# Patient Record
Sex: FEMALE | Race: BLACK OR AFRICAN AMERICAN | HISPANIC OR LATINO | Employment: FULL TIME | URBAN - METROPOLITAN AREA
[De-identification: names, ages, dates, MRNs, and addresses within clinical notes are randomized per-mention and may not be internally consistent; named-entity substitution may affect disease eponyms.]

---

## 2017-01-02 ENCOUNTER — HOSPITAL ENCOUNTER (EMERGENCY)
Facility: HOSPITAL | Age: 37
Discharge: HOME | End: 2017-01-02
Attending: FAMILY MEDICINE
Payer: MEDICAID

## 2017-01-02 VITALS
HEIGHT: 67 IN | OXYGEN SATURATION: 100 % | SYSTOLIC BLOOD PRESSURE: 112 MMHG | HEART RATE: 85 BPM | DIASTOLIC BLOOD PRESSURE: 69 MMHG | WEIGHT: 150 LBS | BODY MASS INDEX: 23.54 KG/M2 | TEMPERATURE: 98 F | RESPIRATION RATE: 20 BRPM

## 2017-01-02 DIAGNOSIS — M54.2 NECK PAIN, ACUTE: ICD-10-CM

## 2017-01-02 DIAGNOSIS — M54.9 BACK PAIN: ICD-10-CM

## 2017-01-02 DIAGNOSIS — M25.512 SHOULDER PAIN, LEFT: ICD-10-CM
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
## 2017-01-02 PROCEDURE — 63600175 PHARM REV CODE 636 W HCPCS: Performed by: FAMILY MEDICINE

## 2017-01-02 PROCEDURE — 96372 THER/PROPH/DIAG INJ SC/IM: CPT

## 2017-01-02 PROCEDURE — 99284 EMERGENCY DEPT VISIT MOD MDM: CPT | Mod: 25

## 2017-01-02 RX ORDER — ORPHENADRINE CITRATE 30 MG/ML
60 INJECTION INTRAMUSCULAR; INTRAVENOUS
Status: COMPLETED | OUTPATIENT
Start: 2017-01-02 | End: 2017-01-02

## 2017-01-02 RX ORDER — IBUPROFEN 800 MG/1
800 TABLET ORAL 3 TIMES DAILY
Qty: 30 TABLET | Refills: 0 | Status: SHIPPED | OUTPATIENT
Start: 2017-01-02 | End: 2017-01-02

## 2017-01-02 RX ORDER — CYCLOBENZAPRINE HCL 10 MG
10 TABLET ORAL 3 TIMES DAILY PRN
Qty: 30 TABLET | Refills: 0 | Status: SHIPPED | OUTPATIENT
Start: 2017-01-02 | End: 2017-01-12

## 2017-01-02 RX ORDER — MORPHINE SULFATE 2 MG/ML
4 INJECTION, SOLUTION INTRAMUSCULAR; INTRAVENOUS
Status: COMPLETED | OUTPATIENT
Start: 2017-01-02 | End: 2017-01-02

## 2017-01-02 RX ORDER — HYDROCODONE BITARTRATE AND ACETAMINOPHEN 10; 325 MG/1; MG/1
1 TABLET ORAL EVERY 8 HOURS PRN
Qty: 18 TABLET | Refills: 0 | Status: SHIPPED | OUTPATIENT
Start: 2017-01-02 | End: 2017-02-09

## 2017-01-02 RX ADMIN — MORPHINE SULFATE 4 MG: 2 INJECTION, SOLUTION INTRAMUSCULAR; INTRAVENOUS at 12:01

## 2017-01-02 RX ADMIN — ORPHENADRINE CITRATE 60 MG: 30 INJECTION INTRAMUSCULAR; INTRAVENOUS at 12:01
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
## 2017-01-23 ENCOUNTER — OFFICE VISIT (OUTPATIENT)
Dept: FAMILY MEDICINE | Facility: CLINIC | Age: 37
End: 2017-01-23
Payer: MEDICAID

## 2017-01-23 VITALS
OXYGEN SATURATION: 98 % | HEART RATE: 60 BPM | WEIGHT: 155.88 LBS | HEIGHT: 67 IN | BODY MASS INDEX: 24.47 KG/M2 | SYSTOLIC BLOOD PRESSURE: 90 MMHG | TEMPERATURE: 98 F | RESPIRATION RATE: 18 BRPM | DIASTOLIC BLOOD PRESSURE: 70 MMHG

## 2017-01-23 DIAGNOSIS — N76.1 CHRONIC VAGINITIS: ICD-10-CM

## 2017-01-23 DIAGNOSIS — R11.0 NAUSEA: Primary | ICD-10-CM

## 2017-01-23 PROCEDURE — 99214 OFFICE O/P EST MOD 30 MIN: CPT | Mod: PBBFAC,PN | Performed by: NURSE PRACTITIONER

## 2017-01-23 PROCEDURE — 99214 OFFICE O/P EST MOD 30 MIN: CPT | Mod: S$PBB,,, | Performed by: NURSE PRACTITIONER

## 2017-01-23 PROCEDURE — 99999 PR PBB SHADOW E&M-EST. PATIENT-LVL IV: CPT | Mod: PBBFAC,,, | Performed by: NURSE PRACTITIONER

## 2017-01-23 RX ORDER — METRONIDAZOLE 500 MG/1
500 TABLET ORAL EVERY 12 HOURS
Qty: 14 TABLET | Refills: 0 | Status: SHIPPED | OUTPATIENT
Start: 2017-01-23 | End: 2017-01-30

## 2017-01-23 RX ORDER — ONDANSETRON 8 MG/1
8 TABLET, ORALLY DISINTEGRATING ORAL EVERY 8 HOURS PRN
Qty: 12 TABLET | Refills: 0 | Status: SHIPPED | OUTPATIENT
Start: 2017-01-23 | End: 2017-02-09

## 2017-01-23 RX ORDER — SERTRALINE HYDROCHLORIDE 50 MG/1
TABLET, FILM COATED ORAL
Refills: 0 | COMMUNITY
Start: 2016-12-29 | End: 2017-02-09

## 2017-01-23 RX ORDER — OXYBUTYNIN CHLORIDE 5 MG/1
TABLET, EXTENDED RELEASE ORAL
COMMUNITY
Start: 2016-12-24 | End: 2017-02-09

## 2017-01-23 RX ORDER — FLUCONAZOLE 150 MG/1
150 TABLET ORAL ONCE
Qty: 1 TABLET | Refills: 0 | Status: SHIPPED | OUTPATIENT
Start: 2017-01-23 | End: 2017-01-23

## 2017-01-23 RX ORDER — OXCARBAZEPINE 300 MG/1
TABLET, FILM COATED ORAL
Refills: 0 | COMMUNITY
Start: 2016-12-29 | End: 2017-02-09

## 2017-01-23 NOTE — PATIENT INSTRUCTIONS
Hypoglycemia (Low Blood Sugar)  Too little sugar (glucose) in your blood is called hypoglycemia or low blood sugar. Low blood sugar usually means anything lower than 70 mg/dL. Talk with your healthcare provider about your target range and what level is too low for you. Diabetes itself doesnt cause low blood sugar. But some of the treatments for diabetes, such as pills or insulin, may raise your risk for it. Low blood sugar may cause you to pass out or have a seizure. So always treat low blood sugar right away, but don't overeat.  Special note: Always carry a source of fast-acting sugar and a snack in case of hypoglycemia.     What you may notice  If you have low blood sugar, you may have one or more of these symptoms:  · Shakiness or dizziness  · Cold, clammy skin or sweating  · Feelings of hunger  · Headache  · Nervousness  · A hard, fast heartbeat  · Weakness  · Confusion or irritability  · Blurred vision  · Having nightmares or waking up confused or sweating  · Numbness or tingling in the lips or tongue  What you should do  Here are tips to follow if you have hypoglycemia:   · First check your blood sugar. If it is too low (out of your target range), eat or drink 15 to 20 grams of fast-acting sugar. This may be 3 to 4 glucose tablets, 4 ounces (half a cup) of fruit juice or regular (nondiet) soda, 8 ounces (1 cup) of fat-free milk, or 1 tablespoon of honey. Dont take more than this, or your blood sugar may go too high.  · Wait 15 minutes. Then recheck your blood sugar if you can.  · If your blood sugar is still too low, repeat the steps above and check your blood sugar again. If your blood sugar still has not returned to your target range, contact your healthcare provider or seek emergency care.  · Once your blood sugar returns to target range, eat a snack or meal.  Preventing low blood sugar  Things you can do include the following:   · If your condition needs a strict treatment plan, eat your meals and  snacks at the same times each day. Dont skip meals!  · If your treatment plan lets you change when you eat and what you eat, learn how to change the time and dose of your rapid-acting insulin to match this.   · Ask your healthcare provider if it is safe for you to drink alcohol. Never drink on an empty stomach.  · Take your medicine at the prescribed times.  · Always carry a source of fast-acting sugar and a snack when youre away from home.  Other things to do  Additional tips include the following:  · Carry a medical ID card, a compact USB drive, or wear a medical alert bracelet or necklace. It should say that you have diabetes. It should also say what to do if you pass out or have a seizure.  · Make sure your family, friends, and coworkers know the signs of low blood sugar. Tell them what to do if your blood sugar falls very low and you cant treat yourself.  · Keep a glucagon emergency kit handy. Be sure your family, friends, and coworkers know how and when to use it. Check it regularly and replace the glucagon before it expires.  · Talk with your health care team about other things you can do to prevent low blood sugar.     If you have unexplained hypoglycemia or hypoglycemia several times, call your healthcare provider.   © 7779-2426 [employer]. [address] [place] [address], [place] [address]. All rights reserved. This information is not intended as a substitute for professional medical care. Always follow your healthcare professional's instructions.        Hypoglycemic Reaction (Nondiabetic)  You have had an episode of low blood sugar (hypoglycemia). A single episode of hypoglycemia does not mean that you are diabetic or that this problem will recur. There are many causes for low blood sugar. These include eating highly refined starchy foods (carbohydrates), drinking too much alcohol, intense exercise, fatigue, stress, poor diet, pregnancy, and certain illnesses.  Your blood sugar level may also be  affected by tobacco, caffeine, and certain medicines, including:  · Aspirin  · Haloperidol  · Propoxyphene  · Chlorpromazine  · Propranolol  · Disopyramide  · ACE inhibitors  A class of medicine called beta-blockers is used for high blood pressure, rapid heart rates, and other conditions. Beta-blockers may prevent the early symptoms of low blood sugar. If you are taking a beta-blocker, you might not realize that your blood sugar is getting low. If you are taking a beta-blocker and are prone to low blood sugar, talk to your healthcare provider about switching to a different class of medicine. The beta-blocker class includes:  · Propranolol  · Atenolol  · Metoprolol  · Nadolol  · Labetalol  · Carvedilol  Home care  · Rest today and resume a normal diet. Eliminate any of the above known causes where possible.  · The proper diet for true hypoglycemia (diagnosed with a glucose tolerance test) is high protein (20% of calories), low carbohydrate (50% of calories), and moderate fat (30% of calories) in 6 small meals per day.  · If this is your first episode of low blood sugar, or if you have not yet been tested with a glucose tolerance test, eat small frequent meals rather than fewer large meals. Limit starchy foods during the next 1 to 2 days to avoid a recurrence of low blood sugar.  · It is important to learn the warning signals your body gives as your blood sugar starts to drop. See the symptoms listed below.  If symptoms of hypoglycemia return  · Keep a source of fast-acting sugar with you. At the first sign of low blood sugar, eat or drink 15 to 20 grams of fast-acting sugar. Examples include:  ¨ 3 to 4 glucose tablets (found at most drugstores)  ¨ 4 ounces of regular soda  ¨ 4 ounces of fruit juice  ¨ 2 tablespoons of raisins  ¨ 1 tablespoon of honey  · If consuming fast-acting sugar does not improve your symptoms within 20 minutes, go to an emergency room.  · If you have severe hypoglycemic spells, wear a medical  alert bracelet or carry a card in your wallet describing this condition. If you have a severe hypoglycemic reaction and are unable to give this information, it will help medical staff provide proper care.  Follow-up care  Follow up with your healthcare provider, or as advised.  When to seek medical advice  Call your healthcare provider right away if any of these symptoms of low blood sugar occur.  · Fatigue or headache  · Trembling or excess sweating  · Hunger  · Feeling anxious or restless  · Vision changes  · Irritability  · Sleepiness  · Dizziness  Call 911  Contact emergency services right away if any of these occur:  · Drowsiness  · Weakness  · Confusion  · Loss of consciousness  © 8695-3772 [employer] [address] [place] [address], [place] [address]. All rights reserved. This information is not intended as a substitute for professional medical care. Always follow your healthcare professional's instructions.        Vaginal Infection: Yeast (Candidiasis)  Yeast infection occurs when yeast in the vagina increase and start attacking the vaginal tissues. Yeast is a type of fungus. These infections are often caused by a type of yeast called Candida albicans. Other species of yeast can also cause infections. Factors that may make infection more likely include recent antibiotic use, douching, or increased sex. Yeast infections are more common in women who have diabetes, or are obese or pregnant, or have a weak immune system.  Symptoms of yeast infection  · Clumpy or thin, white discharge, which may look like cottage cheese  · No odor or minimal odor  · Severe vaginal itching or burning  · Burning with urination  · Swelling, redness of vulva  · Pain during sex  Treating yeast infection  Yeast infection is treated with a vaginal antifungal cream. In some cases, antifungal pills are prescribed instead. During treatment:  · Finish all of your medicine, even if your symptoms go away.  · Apply the cream before  going to bed. Lie flat after applying so that it doesn't drip out.  · Do not douche or use tampons.  · Don't rely on a diaphragm or condoms, since the cream may weaken them.  · Avoid intercourse if advised by your healthcare provider.     Should I treat a yeast infection myself?  Discuss with your healthcare provider whether you should use over-the-counter medicines to treat a yeast infection. Self-treatment may depend on whether:  · You've had a yeast infection in the past.  · You're at risk for STDs.  Call your healthcare provider if symptoms do not go away or come back after treatment.   © 0841-0777 [employer]. [address] [place] [address], [place] [address]. All rights reserved. This information is not intended as a substitute for professional medical care. Always follow your healthcare professional's instructions.        Candida Vaginal Infection    You have a Candida vaginal infection. This is also known as a yeast infection. It is most often caused by a type of yeast (fungus) called Candida. Candida are normally found in the vagina. But if they increase in number, this can lead to infection and cause symptoms.  Symptoms of a yeast infection can include:  · Clumpy or thin, white discharge, which may look like cottage cheese  · Itching or burning  · Burning with urination  Certain factors can make a yeast infection more likely. These can include:  · Taking certain medicines, such as antibiotics or birth control pills  · Pregnancy  · Diabetes  · Weakened immune system  A yeast infection is most often treated with antifungal medicine. This may be given as a vaginal cream or pills you take by mouth. Treatment may last for about 1 to 7 days. Women with severe or recurrent infections may need longer courses of treatment.  Home care  · If youre prescribed medicine, be sure to use it as directed. Finish all of the medicine, even if your symptoms go away. Note: Dont try to treat yourself using  over-the-counter products without talking to your provider first. He or she will let you know if this is a good option for you.  · Ask your provider what steps you can take to help reduce your risk of having a yeast infection in the future.  Follow-up care  Follow up with your healthcare provider, or as directed.  When to seek medical advice  Call your healthcare provider right away if:  · You have a fever of 100.4ºF (38ºC) or higher, or as directed by your provider.  · Your symptoms worsen, or they dont go away within a few days of starting treatment.  · You have new pain in the lower belly or pelvic region.  · You have side effects that bother you or a reaction to the cream or pills youre prescribed.  · You or any partners you have sex with have new symptoms, such as a rash, joint pain, or sores.  © 5098-5681 [employer]. [address] [place] [address], [place] [address]. All rights reserved. This information is not intended as a substitute for professional medical care. Always follow your healthcare professional's instructions.

## 2017-01-23 NOTE — MR AVS SNAPSHOT
[facility]  [address]  [place] [address]  Phone: [phone]                  Valerie Sung   2017 10:00 AM   Office Visit    Description:  Female : 1980   Provider:  NNEKA Arceo   Department:  [facility]           Reason for Visit     Nausea                To Do List           Goals (5 Years of Data)     None      Follow-Up and Disposition     Return if symptoms worsen or fail to improve.       These Medications        Disp Refills Start End    fluconazole (DIFLUCAN) 150 MG Tab 1 tablet 0 2017    Take 1 tablet (150 mg total) by mouth once. - Oral    Pharmacy: [facility] [address] [facility] AT [facility] Ph #: [phone]       metronidazole (FLAGYL) 500 MG tablet 14 tablet 0 2017    Take 1 tablet (500 mg total) by mouth every 12 (twelve) hours. - Oral    Pharmacy: [facility] [address], [place] [address] [facility] Ph #: [phone]       ondansetron (ZOFRAN-ODT) 8 MG TbDL 12 tablet 0 2017     Take 1 tablet (8 mg total) by mouth every 8 (eight) hours as needed. - Oral    Pharmacy: [facility] [address] [facility] AT [facility] Ph #: [phone]         [facility]     [facility] On Call Nurse Care Line -  Assistance  Registered nurses in the [facility] On Call Center provide clinical advisement, health education, appointment booking, and other advisory services.  Call for this free service at [phone].             Medications           Message regarding Medications     Verify the changes and/or additions to your medication regime listed below are the same as discussed with your clinician today.  If any of these changes or additions are incorrect, please notify your healthcare provider.        START taking these NEW medications         "Refills    fluconazole (DIFLUCAN) 150 MG Tab 0    Sig: Take 1 tablet (150 mg total) by mouth once.    Class: Normal    Route: Oral    metronidazole (FLAGYL) 500 MG tablet 0    Sig: Take 1 tablet (500 mg total) by mouth every 12 (twelve) hours.    Class: Normal    Route: Oral    ondansetron (ZOFRAN-ODT) 8 MG TbDL 0    Sig: Take 1 tablet (8 mg total) by mouth every 8 (eight) hours as needed.    Class: Normal    Route: Oral           Verify that the below list of medications is an accurate representation of the medications you are currently taking.  If none reported, the list may be blank. If incorrect, please contact your healthcare provider. Carry this list with you in case of emergency.           Current Medications     oxcarbazepine (TRILEPTAL) 300 MG Tab TK 1 T PO BID    oxybutynin (DITROPAN-XL) 5 MG TR24     sertraline (ZOLOFT) 50 MG tablet TK 1 T PO IN THE MORNING    fluconazole (DIFLUCAN) 150 MG Tab Take 1 tablet (150 mg total) by mouth once.    hydrocodone-acetaminophen 10-325mg (NORCO)  mg Tab Take 1 tablet by mouth every 8 (eight) hours as needed for Pain.    metronidazole (FLAGYL) 500 MG tablet Take 1 tablet (500 mg total) by mouth every 12 (twelve) hours.    ondansetron (ZOFRAN-ODT) 8 MG TbDL Take 1 tablet (8 mg total) by mouth every 8 (eight) hours as needed.           Clinical Reference Information           Vital Signs - Last Recorded  Most recent update: 1/23/2017  9:52 AM by Susan Reed MA    BP Pulse Temp Resp Ht Wt    90/70 (BP Location: Left arm, Patient Position: Sitting, BP Method: Manual) 60 98.4 °F (36.9 °C) (Oral) 18 5' 7" (1.702 m) 70.7 kg (155 lb 13.8 oz)    LMP SpO2 BMI          12/25/2015 98% 24.41 kg/m2        Blood Pressure          Most Recent Value    BP  90/70      Allergies as of 1/23/2017     Adhesive    Other    Penicillins    Dilaudid [Hydromorphone (Bulk)]    Latex, Natural Rubber    Sulfur      Immunizations Administered on Date of Encounter - 1/23/2017     None    "   Maintenance Dialysis History     Patient has no recorded history of maintenance dialysis.      Instructions      Hypoglycemia (Low Blood Sugar)  Too little sugar (glucose) in your blood is called hypoglycemia or low blood sugar. Low blood sugar usually means anything lower than 70 mg/dL. Talk with your healthcare provider about your target range and what level is too low for you. Diabetes itself doesnt cause low blood sugar. But some of the treatments for diabetes, such as pills or insulin, may raise your risk for it. Low blood sugar may cause you to pass out or have a seizure. So always treat low blood sugar right away, but don't overeat.  Special note: Always carry a source of fast-acting sugar and a snack in case of hypoglycemia.     What you may notice  If you have low blood sugar, you may have one or more of these symptoms:  · Shakiness or dizziness  · Cold, clammy skin or sweating  · Feelings of hunger  · Headache  · Nervousness  · A hard, fast heartbeat  · Weakness  · Confusion or irritability  · Blurred vision  · Having nightmares or waking up confused or sweating  · Numbness or tingling in the lips or tongue  What you should do  Here are tips to follow if you have hypoglycemia:   · First check your blood sugar. If it is too low (out of your target range), eat or drink 15 to 20 grams of fast-acting sugar. This may be 3 to 4 glucose tablets, 4 ounces (half a cup) of fruit juice or regular (nondiet) soda, 8 ounces (1 cup) of fat-free milk, or 1 tablespoon of honey. Dont take more than this, or your blood sugar may go too high.  · Wait 15 minutes. Then recheck your blood sugar if you can.  · If your blood sugar is still too low, repeat the steps above and check your blood sugar again. If your blood sugar still has not returned to your target range, contact your healthcare provider or seek emergency care.  · Once your blood sugar returns to target range, eat a snack or meal.  Preventing low blood  sugar  Things you can do include the following:   · If your condition needs a strict treatment plan, eat your meals and snacks at the same times each day. Dont skip meals!  · If your treatment plan lets you change when you eat and what you eat, learn how to change the time and dose of your rapid-acting insulin to match this.   · Ask your healthcare provider if it is safe for you to drink alcohol. Never drink on an empty stomach.  · Take your medicine at the prescribed times.  · Always carry a source of fast-acting sugar and a snack when youre away from home.  Other things to do  Additional tips include the following:  · Carry a medical ID card, a compact USB drive, or wear a medical alert bracelet or necklace. It should say that you have diabetes. It should also say what to do if you pass out or have a seizure.  · Make sure your family, friends, and coworkers know the signs of low blood sugar. Tell them what to do if your blood sugar falls very low and you cant treat yourself.  · Keep a glucagon emergency kit handy. Be sure your family, friends, and coworkers know how and when to use it. Check it regularly and replace the glucagon before it expires.  · Talk with your health care team about other things you can do to prevent low blood sugar.     If you have unexplained hypoglycemia or hypoglycemia several times, call your healthcare provider.   © 7428-1456 [employer] [address] [place] [address], [place] [address]. All rights reserved. This information is not intended as a substitute for professional medical care. Always follow your healthcare professional's instructions.        Hypoglycemic Reaction (Nondiabetic)  You have had an episode of low blood sugar (hypoglycemia). A single episode of hypoglycemia does not mean that you are diabetic or that this problem will recur. There are many causes for low blood sugar. These include eating highly refined starchy foods (carbohydrates), drinking too much  alcohol, intense exercise, fatigue, stress, poor diet, pregnancy, and certain illnesses.  Your blood sugar level may also be affected by tobacco, caffeine, and certain medicines, including:  · Aspirin  · Haloperidol  · Propoxyphene  · Chlorpromazine  · Propranolol  · Disopyramide  · ACE inhibitors  A class of medicine called beta-blockers is used for high blood pressure, rapid heart rates, and other conditions. Beta-blockers may prevent the early symptoms of low blood sugar. If you are taking a beta-blocker, you might not realize that your blood sugar is getting low. If you are taking a beta-blocker and are prone to low blood sugar, talk to your healthcare provider about switching to a different class of medicine. The beta-blocker class includes:  · Propranolol  · Atenolol  · Metoprolol  · Nadolol  · Labetalol  · Carvedilol  Home care  · Rest today and resume a normal diet. Eliminate any of the above known causes where possible.  · The proper diet for true hypoglycemia (diagnosed with a glucose tolerance test) is high protein (20% of calories), low carbohydrate (50% of calories), and moderate fat (30% of calories) in 6 small meals per day.  · If this is your first episode of low blood sugar, or if you have not yet been tested with a glucose tolerance test, eat small frequent meals rather than fewer large meals. Limit starchy foods during the next 1 to 2 days to avoid a recurrence of low blood sugar.  · It is important to learn the warning signals your body gives as your blood sugar starts to drop. See the symptoms listed below.  If symptoms of hypoglycemia return  · Keep a source of fast-acting sugar with you. At the first sign of low blood sugar, eat or drink 15 to 20 grams of fast-acting sugar. Examples include:  ¨ 3 to 4 glucose tablets (found at most drugstores)  ¨ 4 ounces of regular soda  ¨ 4 ounces of fruit juice  ¨ 2 tablespoons of raisins  ¨ 1 tablespoon of honey  · If consuming fast-acting sugar does not  improve your symptoms within 20 minutes, go to an emergency room.  · If you have severe hypoglycemic spells, wear a medical alert bracelet or carry a card in your wallet describing this condition. If you have a severe hypoglycemic reaction and are unable to give this information, it will help medical staff provide proper care.  Follow-up care  Follow up with your healthcare provider, or as advised.  When to seek medical advice  Call your healthcare provider right away if any of these symptoms of low blood sugar occur.  · Fatigue or headache  · Trembling or excess sweating  · Hunger  · Feeling anxious or restless  · Vision changes  · Irritability  · Sleepiness  · Dizziness  Call 911  Contact emergency services right away if any of these occur:  · Drowsiness  · Weakness  · Confusion  · Loss of consciousness  © 3068-7785 [employer]. [address] [place] [address]. All rights reserved. This information is not intended as a substitute for professional medical care. Always follow your healthcare professional's instructions.        Vaginal Infection: Yeast (Candidiasis)  Yeast infection occurs when yeast in the vagina increase and start attacking the vaginal tissues. Yeast is a type of fungus. These infections are often caused by a type of yeast called Candida albicans. Other species of yeast can also cause infections. Factors that may make infection more likely include recent antibiotic use, douching, or increased sex. Yeast infections are more common in women who have diabetes, or are obese or pregnant, or have a weak immune system.  Symptoms of yeast infection  · Clumpy or thin, white discharge, which may look like cottage cheese  · No odor or minimal odor  · Severe vaginal itching or burning  · Burning with urination  · Swelling, redness of vulva  · Pain during sex  Treating yeast infection  Yeast infection is treated with a vaginal antifungal cream. In some cases, antifungal pills are  prescribed instead. During treatment:  · Finish all of your medicine, even if your symptoms go away.  · Apply the cream before going to bed. Lie flat after applying so that it doesn't drip out.  · Do not douche or use tampons.  · Don't rely on a diaphragm or condoms, since the cream may weaken them.  · Avoid intercourse if advised by your healthcare provider.     Should I treat a yeast infection myself?  Discuss with your healthcare provider whether you should use over-the-counter medicines to treat a yeast infection. Self-treatment may depend on whether:  · You've had a yeast infection in the past.  · You're at risk for STDs.  Call your healthcare provider if symptoms do not go away or come back after treatment.   © 9333-1114 [employer]. [address] [place] [address], [place] [address]. All rights reserved. This information is not intended as a substitute for professional medical care. Always follow your healthcare professional's instructions.        Candida Vaginal Infection    You have a Candida vaginal infection. This is also known as a yeast infection. It is most often caused by a type of yeast (fungus) called Candida. Candida are normally found in the vagina. But if they increase in number, this can lead to infection and cause symptoms.  Symptoms of a yeast infection can include:  · Clumpy or thin, white discharge, which may look like cottage cheese  · Itching or burning  · Burning with urination  Certain factors can make a yeast infection more likely. These can include:  · Taking certain medicines, such as antibiotics or birth control pills  · Pregnancy  · Diabetes  · Weakened immune system  A yeast infection is most often treated with antifungal medicine. This may be given as a vaginal cream or pills you take by mouth. Treatment may last for about 1 to 7 days. Women with severe or recurrent infections may need longer courses of treatment.  Home care  · If youre prescribed medicine, be sure to use it  as directed. Finish all of the medicine, even if your symptoms go away. Note: Dont try to treat yourself using over-the-counter products without talking to your provider first. He or she will let you know if this is a good option for you.  · Ask your provider what steps you can take to help reduce your risk of having a yeast infection in the future.  Follow-up care  Follow up with your healthcare provider, or as directed.  When to seek medical advice  Call your healthcare provider right away if:  · You have a fever of 100.4ºF (38ºC) or higher, or as directed by your provider.  · Your symptoms worsen, or they dont go away within a few days of starting treatment.  · You have new pain in the lower belly or pelvic region.  · You have side effects that bother you or a reaction to the cream or pills youre prescribed.  · You or any partners you have sex with have new symptoms, such as a rash, joint pain, or sores.  © 7373-4277 [employer]. [address] [place] [address], [place] [address]. All rights reserved. This information is not intended as a substitute for professional medical care. Always follow your healthcare professional's instructions.

## 2017-01-23 NOTE — PROGRESS NOTES
Subjective:       Patient ID: Valerie Sung is a 36 y.o. female.    Chief Complaint: Nausea    HPI Comments: 37 yo female presents for nausea. She states the insurance denied her Botox injections for migraines, so she did not get her injection on Friday. She has been nauseated since.     Nausea   This is a new problem. The current episode started in the past 7 days. The problem occurs constantly. The problem has been gradually worsening. Associated symptoms include headaches and nausea. Pertinent negatives include no vomiting or weakness. The symptoms are aggravated by walking. Treatments tried: Baclofen. The treatment provided no relief.       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  [employer]     Social History Main Topics    Smoking status: Never Smoker    Smokeless tobacco: Never Used    Alcohol use No      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 Negative for constipation, diarrhea and vomiting.   Neurological: Positive for dizziness and headaches. Negative for weakness.   All other systems reviewed and are negative.      Objective:     Visit Vitals    BP 90/70 (BP Location: Left arm, Patient Position: Sitting, BP Method: Manual)    Pulse 60    Temp 98.4 °F (36.9 °C) (Oral)    Resp 18    Ht 5' 7" (1.702 m)    Wt 70.7 kg (155 lb 13.8 oz)    LMP 12/25/2015    SpO2 98%    BMI 24.41 kg/m2        Physical Exam   Constitutional: She is oriented to person, place, and time. She appears well-developed and well-nourished.   HENT:   Head: Normocephalic and atraumatic.   Cardiovascular: Normal rate, regular rhythm and normal heart sounds.    Pulmonary/Chest: Effort normal and breath sounds normal. No respiratory distress. She has no decreased breath sounds.   Abdominal: Soft. She exhibits no distension and no mass. Bowel sounds are increased. There is tenderness in the left upper quadrant. There is no rigidity, no guarding and no CVA tenderness.   Neurological: She is alert and oriented to person, place, and time.   Skin: Skin is warm, dry and intact. She is not diaphoretic. No pallor.   Psychiatric: She has a normal mood and affect. Her speech is normal and behavior is normal.       Assessment:       1. Nausea    2. Chronic vaginitis        Plan:       Valerie was seen today for nausea.    Diagnoses and all orders for this visit:    Nausea  -     ondansetron (ZOFRAN-ODT) 8 MG TbDL; Take 1 tablet (8 mg total) by mouth every 8 (eight) hours as needed.    Chronic vaginitis  -     fluconazole (DIFLUCAN) 150 MG Tab; Take 1 tablet (150 mg total) by mouth once.  -     metronidazole (FLAGYL) 500 MG tablet; Take 1 tablet (500 mg total) by mouth every 12 (twelve) hours.      She will take medication as " "prescribed. She will follow up with Neuro to see about Botox injection. She was encouraged to use lactobacillus and increase yogurt intake. She was provided home care instructions on diagnosis.   Return if symptoms worsen or fail to improve.  "This note will not be shared with the patient."  "

## 2017-02-02 RX ORDER — FLUCONAZOLE 150 MG/1
150 TABLET ORAL DAILY
Qty: 1 TABLET | Refills: 0 | Status: SHIPPED | OUTPATIENT
Start: 2017-02-02 | End: 2017-02-03

## 2017-02-02 NOTE — TELEPHONE ENCOUNTER
Spoke with patient. Seen in clinic 1-. She was given prescription for Flagyl and Diflucan. Patient completed Flagyl 1-. She has continued yeast infection symptoms and would like refill for Diflucan sent to [facility], [place].     Please advise

## 2017-02-06 ENCOUNTER — TELEPHONE (OUTPATIENT)
Dept: FAMILY MEDICINE | Facility: CLINIC | Age: 37
End: 2017-02-06

## 2017-02-06 NOTE — TELEPHONE ENCOUNTER
----- Message from Carly Sandhu sent at 2/6/2017 11:33 AM CST -----  Contact: self  Pt is requesting a full blood work up. [phone]

## 2017-02-06 NOTE — TELEPHONE ENCOUNTER
Spoke with patient and she informed me that her psychiatrist advise that she do a full blood work up due to her medications. Informed patient that she is due for her annual exam and that we can schedule lab with visit. Patient made appointment with provider on 2/9/17.

## 2017-02-09 ENCOUNTER — LAB VISIT (OUTPATIENT)
Dept: LAB | Facility: HOSPITAL | Age: 37
End: 2017-02-09
Attending: FAMILY MEDICINE
Payer: MEDICAID

## 2017-02-09 ENCOUNTER — OFFICE VISIT (OUTPATIENT)
Dept: FAMILY MEDICINE | Facility: CLINIC | Age: 37
End: 2017-02-09
Payer: MEDICAID

## 2017-02-09 VITALS
HEART RATE: 76 BPM | WEIGHT: 156 LBS | HEIGHT: 67 IN | RESPIRATION RATE: 18 BRPM | SYSTOLIC BLOOD PRESSURE: 110 MMHG | TEMPERATURE: 98 F | BODY MASS INDEX: 24.48 KG/M2 | OXYGEN SATURATION: 99 % | DIASTOLIC BLOOD PRESSURE: 74 MMHG

## 2017-02-09 DIAGNOSIS — Z00.00 ROUTINE GENERAL MEDICAL EXAMINATION AT A HEALTH CARE FACILITY: Primary | ICD-10-CM

## 2017-02-09 DIAGNOSIS — Z23 NEED FOR TDAP VACCINATION: ICD-10-CM

## 2017-02-09 DIAGNOSIS — Z00.00 ROUTINE GENERAL MEDICAL EXAMINATION AT A HEALTH CARE FACILITY: ICD-10-CM

## 2017-02-09 DIAGNOSIS — K43.9 VENTRAL HERNIA WITHOUT OBSTRUCTION OR GANGRENE: ICD-10-CM

## 2017-02-09 LAB
ALBUMIN SERPL BCP-MCNC: 3.8 G/DL
ALP SERPL-CCNC: 53 U/L
ALT SERPL W/O P-5'-P-CCNC: 7 U/L
ANION GAP SERPL CALC-SCNC: 7 MMOL/L
AST SERPL-CCNC: 15 U/L
BASOPHILS # BLD AUTO: 0.02 K/UL
BASOPHILS NFR BLD: 0.4 %
BILIRUB SERPL-MCNC: 0.4 MG/DL
BUN SERPL-MCNC: 16 MG/DL
CALCIUM SERPL-MCNC: 9.2 MG/DL
CHLORIDE SERPL-SCNC: 105 MMOL/L
CHOLEST/HDLC SERPL: 3.4 {RATIO}
CO2 SERPL-SCNC: 25 MMOL/L
CREAT SERPL-MCNC: 1.1 MG/DL
DIFFERENTIAL METHOD: ABNORMAL
EOSINOPHIL # BLD AUTO: 0.1 K/UL
EOSINOPHIL NFR BLD: 1.9 %
ERYTHROCYTE [DISTWIDTH] IN BLOOD BY AUTOMATED COUNT: 13.9 %
EST. GFR  (AFRICAN AMERICAN): >60 ML/MIN/1.73 M^2
EST. GFR  (NON AFRICAN AMERICAN): >60 ML/MIN/1.73 M^2
GLUCOSE SERPL-MCNC: 77 MG/DL
HCT VFR BLD AUTO: 41.2 %
HDL/CHOLESTEROL RATIO: 29.4 %
HDLC SERPL-MCNC: 272 MG/DL
HDLC SERPL-MCNC: 80 MG/DL
HGB BLD-MCNC: 13.2 G/DL
LDLC SERPL CALC-MCNC: 174 MG/DL
LYMPHOCYTES # BLD AUTO: 1.9 K/UL
LYMPHOCYTES NFR BLD: 36.3 %
MCH RBC QN AUTO: 26.3 PG
MCHC RBC AUTO-ENTMCNC: 32 %
MCV RBC AUTO: 82 FL
MONOCYTES # BLD AUTO: 0.4 K/UL
MONOCYTES NFR BLD: 8 %
NEUTROPHILS # BLD AUTO: 2.7 K/UL
NEUTROPHILS NFR BLD: 53.2 %
NONHDLC SERPL-MCNC: 192 MG/DL
PLATELET # BLD AUTO: 262 K/UL
PMV BLD AUTO: 8.9 FL
POTASSIUM SERPL-SCNC: 5 MMOL/L
PROT SERPL-MCNC: 7.5 G/DL
RBC # BLD AUTO: 5.02 M/UL
SODIUM SERPL-SCNC: 137 MMOL/L
TRIGL SERPL-MCNC: 90 MG/DL
TSH SERPL DL<=0.005 MIU/L-ACNC: 0.96 UIU/ML
WBC # BLD AUTO: 5.15 K/UL

## 2017-02-09 PROCEDURE — 99999 PR PBB SHADOW E&M-EST. PATIENT-LVL IV: CPT | Mod: PBBFAC,,, | Performed by: FAMILY MEDICINE

## 2017-02-09 PROCEDURE — 84443 ASSAY THYROID STIM HORMONE: CPT

## 2017-02-09 PROCEDURE — 36415 COLL VENOUS BLD VENIPUNCTURE: CPT

## 2017-02-09 PROCEDURE — 85025 COMPLETE CBC W/AUTO DIFF WBC: CPT

## 2017-02-09 PROCEDURE — 80061 LIPID PANEL: CPT

## 2017-02-09 PROCEDURE — 99395 PREV VISIT EST AGE 18-39: CPT | Mod: S$PBB,,, | Performed by: FAMILY MEDICINE

## 2017-02-09 PROCEDURE — 80053 COMPREHEN METABOLIC PANEL: CPT

## 2017-02-09 RX ORDER — ALPRAZOLAM 0.5 MG/1
TABLET ORAL
Refills: 1 | COMMUNITY
Start: 2017-01-28 | End: 2017-02-23

## 2017-02-09 RX ORDER — BACLOFEN 10 MG/1
10 TABLET ORAL 2 TIMES DAILY
COMMUNITY
End: 2017-02-23

## 2017-02-09 NOTE — MR AVS SNAPSHOT
[facility]  [address]  [place] [address]  Phone: [phone]                  Valerie Sung   2017 10:30 AM   Office Visit    Description:  Female : 1980   Provider:  Colin Martinez MD   Department:  [facility]           Reason for Visit     Annual Exam     Vaginitis           Diagnoses this Visit        Comments    Routine general medical examination at a health care facility    -  Primary     Ventral hernia without obstruction or gangrene         Need for Tdap vaccination                To Do List           Goals (5 Years of Data)     None      Follow-Up and Disposition     Return in about 1 year (around 2018), or if symptoms worsen or fail to improve.      [facility]     [facility] On Call Nurse [facility] Line -  Assistance  Registered nurses in the [facility] On Call Center provide clinical advisement, health education, appointment booking, and other advisory services.  Call for this free service at [phone].             Medications           Message regarding Medications     Verify the changes and/or additions to your medication regime listed below are the same as discussed with your clinician today.  If any of these changes or additions are incorrect, please notify your healthcare provider.        STOP taking these medications     hydrocodone-acetaminophen 10-325mg (NORCO)  mg Tab Take 1 tablet by mouth every 8 (eight) hours as needed for Pain.    ondansetron (ZOFRAN-ODT) 8 MG TbDL Take 1 tablet (8 mg total) by mouth every 8 (eight) hours as needed.    oxcarbazepine (TRILEPTAL) 300 MG Tab TK 1 T PO BID    oxybutynin (DITROPAN-XL) 5 MG TR24     sertraline (ZOLOFT) 50 MG tablet TK 1 T PO IN THE MORNING           Verify that the below list of medications is an accurate representation of the medications you are currently taking.  If none reported, the list may be blank. If incorrect, please contact your healthcare provider.  "Carry this list with you in case of emergency.           Current Medications     alprazolam (XANAX) 0.5 MG tablet TK 1 T PO BID    baclofen (LIORESAL) 10 MG tablet Take 10 mg by mouth 2 (two) times daily.           Clinical Reference Information           Your Vitals Were     BP Pulse Temp Resp Height Weight    110/74 (BP Location: Left arm, Patient Position: Sitting, BP Method: Manual) 76 98.2 °F (36.8 °C) (Oral) 18 5' 7" (1.702 m) 70.8 kg (156 lb)    Last Period SpO2 BMI          12/25/2015 99% 24.43 kg/m2        Blood Pressure          Most Recent Value    BP  110/74      Allergies as of 2/9/2017     Adhesive    Other    Penicillins    Dilaudid [Hydromorphone (Bulk)]    Latex, Natural Rubber    Sulfur      Immunizations Administered on Date of Encounter - 2/9/2017     Name Date Dose VIS Date Route    TDAP 2/9/2017 0.5 mL 2/24/2015 Intramuscular      Orders Placed During Today's Visit      Normal Orders This Visit    Ambulatory referral to General Surgery     Ambulatory referral to Obstetrics / Gynecology     Tdap Vaccine     Future Labs/Procedures Expected by Expires    CBC auto differential  2/9/2017 2/9/2018    Comprehensive metabolic panel  2/9/2017 4/10/2018    Lipid panel  2/9/2017 4/10/2018    TSH  2/9/2017 4/10/2018    Urinalysis  2/9/2017 4/10/2018      Maintenance Dialysis History     Patient has no recorded history of maintenance dialysis.      Language Assistance Services     ATTENTION: Language assistance services are available, free of charge. Please call [phone].      ATENCIÓN: Si habla olga lidiaañol, tiene a burch disposición servicios gratuitos de asistencia lingüística. Llame al [phone].     CHÚ Ý: N?u b?n nói Ti?ng Vi?t, có các d?ch v? h? tr? ngôn ng? mi?n phí dành cho b?n. G?i s? [phone].         [facility] complies with applicable Federal civil rights laws and does not discriminate on the basis of race, color, national origin, age, disability, or sex.        "

## 2017-02-09 NOTE — PROGRESS NOTES
Patient tolerated Tdap with no complication. Patient received VIS information. Patient advise to wait 15 min for possible reactions.

## 2017-02-09 NOTE — PROGRESS NOTES
"Well Woman VISIT      CHIEF COMPLAINT  Chief Complaint   Patient presents with    Annual Exam    Vaginitis       HPI  Valerie Sung is a 36 y.o. female who presents physical.     Social Factors  Tobacco use: No  Ready to Quit: No  Alcohol: No  Intimate partner violence screening  "Do you feel safe in your current relationship?" Yes   "Have you ever been in a relationship in which your partner frightened you or hurt you?" No  Living Will/POA: No  Regular Exercise: No    Depression  Over the past two weeks, have you felt down, depressed, or hopeless? No  Over the past two weeks, have you felt little interest or pleasure in doing things? No    Reproductive Health  Has had hysterectomy without oophorectomy  No vaginal bleeding    CHD, HTN, DM2  CHD Risk Factors: none  Women 45 years and older should be screened for dyslipidemia if at increased risk of CHD  Women 20 to 45 years of age should be screened for dyslipidemia if at increased risk of CHD  Asymptomatic adults with sustained blood pressure greater than 135/80 mm Hg (treated or untreated) should be screened for type 2 diabetes mellitus    Estimated body mass index is 24.43 kg/(m^2) as calculated from the following:    Height as of this encounter: 5' 7" (1.702 m).    Weight as of this encounter: 70.8 kg (156 lb).      Screening  Mammogram needed: n/a  Colonoscopy needed: n/a  Osteoporosis screen needed: n/a     Women 50 to 74 years of age should be screened for breast cancer with mammography biennially.  Women should be screened for cervical cancer with Pap tests beginning at 21 years of age. Low-risk women should receive Pap testing every three years. Co-testing for human papillomavirus is an option beginning at 30 years of age, and can extend the screening interval to five years. Cervical cancer screening should be discontinued at 65 years of age or after total hysterectomy if the woman has a benign gynecologic history  Adults 50 to 75 years of " "age should be screened for colorectal cancer with an FOBT annually, sigmoidoscopy every five years with an FOBT every three years, or colonoscopy every 10 years.  Women 65 years and older should be screened for osteoporosis. Women younger than 65 years should be screened if the risk of fracture is greater than or equal to that of a 65-year-old white woman without additional risk factors.      Immunizations  delayed    ALLERGIES and MEDS were verified.   PMHx, PSHx, FHx, SOCIALHx were updated as pertinent.    REVIEW OF SYSTEMS  Review of Systems   Constitutional: Negative.    HENT: Negative.    Eyes: Negative.    Respiratory: Negative.    Cardiovascular: Negative.    Gastrointestinal: Negative.    Genitourinary: Negative.    Musculoskeletal: Negative.    Skin: Negative.    Neurological: Negative.          PHYSICAL EXAM  VITAL SIGNS:   Visit Vitals    /74 (BP Location: Left arm, Patient Position: Sitting, BP Method: Manual)    Pulse 76    Temp 98.2 °F (36.8 °C) (Oral)    Resp 18    Ht 5' 7" (1.702 m)    Wt 70.8 kg (156 lb)    LMP 12/25/2015    SpO2 99%    BMI 24.43 kg/m2     GEN: Well developed, Well nourished, No acute distress.  HENT: Normocephalic, Atraumatic, Bilateral external ears normal, Nose normal, Oropharynx moist, No oral exudates.   Eyes: PERRL, EOMI, Conjunctiva normal, No discharge.   Neck: Supple, No tenderness.  Lymphatic: No cervical or supraclavicular lymphadenopathy noted.   Cardiovascular: Normal heart rate, Normal rhythm, No murmurs, No rubs, No gallops.   Thorax & Lungs: Normal breath sounds, No respiratory distress, No wheezing.  Abdomen: Soft, No tenderness, Bowel sounds normal.  Breast: deferred to OBGYN  Genital: deferred to OBGYN   Skin: Warm, Dry, No erythema, No rash.   Extremities: No edema, No tenderness.       ASSESSMENT/PLAN    Valerie was seen today for annual exam and vaginitis.    Diagnoses and all orders for this visit:    Routine general medical examination at a " health care facility  -     CBC auto differential; Future  -     Comprehensive metabolic panel; Future  -     Lipid panel; Future  -     Urinalysis; Future  -     TSH; Future  -     Ambulatory referral to Obstetrics / Gynecology  -     Tdap Vaccine    Ventral hernia without obstruction or gangrene  -     Ambulatory referral to General Surgery    Need for Tdap vaccination         FOLLOW UP: 1 year or sooner if needed    Colin Martinez MD

## 2017-02-10 ENCOUNTER — TELEPHONE (OUTPATIENT)
Dept: FAMILY MEDICINE | Facility: CLINIC | Age: 37
End: 2017-02-10

## 2017-02-22 ENCOUNTER — OFFICE VISIT (OUTPATIENT)
Dept: OBSTETRICS AND GYNECOLOGY | Facility: CLINIC | Age: 37
End: 2017-02-22
Payer: MEDICAID

## 2017-02-22 VITALS
DIASTOLIC BLOOD PRESSURE: 76 MMHG | WEIGHT: 156.06 LBS | BODY MASS INDEX: 24.49 KG/M2 | SYSTOLIC BLOOD PRESSURE: 120 MMHG | HEIGHT: 67 IN

## 2017-02-22 DIAGNOSIS — N76.0 VULVOVAGINITIS: Primary | ICD-10-CM

## 2017-02-22 DIAGNOSIS — N89.8 VAGINAL DISCHARGE: ICD-10-CM

## 2017-02-22 PROCEDURE — 99213 OFFICE O/P EST LOW 20 MIN: CPT | Mod: S$PBB,,, | Performed by: OBSTETRICS & GYNECOLOGY

## 2017-02-22 PROCEDURE — 99213 OFFICE O/P EST LOW 20 MIN: CPT | Mod: PBBFAC | Performed by: OBSTETRICS & GYNECOLOGY

## 2017-02-22 PROCEDURE — 87480 CANDIDA DNA DIR PROBE: CPT

## 2017-02-22 PROCEDURE — 99999 PR PBB SHADOW E&M-EST. PATIENT-LVL III: CPT | Mod: PBBFAC,,, | Performed by: OBSTETRICS & GYNECOLOGY

## 2017-02-22 NOTE — MR AVS SNAPSHOT
"    [facility]  [address] [facility] [address]  [address]  [place] [address]  Phone: [phone]                  Valerie Sung   2017 11:10 AM   Office Visit    Description:  Female : 1980   Provider:  Mikaela Coleman MD   Department:  [facility]           Reason for Visit     Vaginitis     Vaginal Discharge           Diagnoses this Visit        Comments    Vulvovaginitis    -  Primary     Vaginal discharge                To Do List           Goals (5 Years of Data)     None      [facility]     [facility] On Call Nurse Care Line -  Assistance  Registered nurses in the [facility] On Call Center provide clinical advisement, health education, appointment booking, and other advisory services.  Call for this free service at [phone].             Medications           Message regarding Medications     Verify the changes and/or additions to your medication regime listed below are the same as discussed with your clinician today.  If any of these changes or additions are incorrect, please notify your healthcare provider.             Verify that the below list of medications is an accurate representation of the medications you are currently taking.  If none reported, the list may be blank. If incorrect, please contact your healthcare provider. Carry this list with you in case of emergency.           Current Medications     alprazolam (XANAX) 0.5 MG tablet TK 1 T PO BID    baclofen (LIORESAL) 10 MG tablet Take 10 mg by mouth 2 (two) times daily.           Clinical Reference Information           Your Vitals Were     BP Height Weight Last Period BMI    120/76 5' 7" (1.702 m) 70.8 kg (156 lb 1.4 oz) 2015 24.45 kg/m2      Blood Pressure          Most Recent Value    BP  120/76      Allergies as of 2017     Adhesive    Other    Penicillins    Dilaudid [Hydromorphone (Bulk)]    Latex, Natural Rubber    Sulfur      Immunizations Administered on Date of Encounter - 2017     " None      Orders Placed During Today's Visit      Normal Orders This Visit    Vaginosis Screen by DNA Probe       Maintenance Dialysis History     Patient has no recorded history of maintenance dialysis.      Instructions    Vaginal atrophy (dryness):   Use REPLENS or REFRESH OTC: 1/2 applicator full in vagina twice a week. Lubrication with intercourse:  Use astroglide instead of KY -- KY is drying.   Can also use olive oil, not extra virgin. Very small amount for intercourse. A little bit goes a LONG way.         Language Assistance Services     ATTENTION: Language assistance services are available, free of charge. Please call [phone].      ATENCIÓN: Si mariyala curtis, tiene a burch disposición servicios gratuitos de asistencia lingüística. Llame al [phone].     BALA Ý: N?u b?n nói Ti?ng Vi?t, có các d?ch v? h? tr? ngôn ng? mi?n phí dành cho b?n. G?i s? [phone].         [facility] [place] [place] [facility] complies with applicable Federal civil rights laws and does not discriminate on the basis of race, color, national origin, age, disability, or sex.

## 2017-02-22 NOTE — LETTER
February 22, 2017      Colin Martinez MD  [address] [facility] [address]           [facility]  [address] [facility] [address]  [address]  [facility] [address]  Phone: [phone]          Patient: Valerie Sung   MR Number: [id number]   YOB: 1980   Date of Visit: 2/22/2017       Dear Dr. Colin MARTINEZ Page:    Thank you for referring Valerie Sung to me for evaluation. Attached you will find relevant portions of my assessment and plan of care.    If you have questions, please do not hesitate to call me. I look forward to following Valerie Sung along with you.    Sincerely,    Mikaela Coleman MD    Enclosure  CC:  No Recipients    If you would like to receive this communication electronically, please contact [email or web address] or [phone] to request more information on [employer] [facility] access.    For providers and/or their staff who would like to refer a patient to [facility], please contact us through our one-stop-shop provider referral line, [facility], at [phone].    If you feel you have received this communication in error or would no longer like to receive these types of communications, please e-mail [email or web address]

## 2017-02-22 NOTE — PATIENT INSTRUCTIONS
Vaginal atrophy (dryness):   Use REPLENS or REFRESH OTC: 1/2 applicator full in vagina twice a week. Lubrication with intercourse:  Use astroglide instead of KY -- KY is drying.   Can also use olive oil, not extra virgin. Very small amount for intercourse. A little bit goes a LONG way.

## 2017-02-22 NOTE — PROGRESS NOTES
Subjective:       Patient ID: Valerie Sung is a 36 y.o. female.    Chief Complaint:  Vaginitis and Vaginal Discharge (has odor)      History of Present Illness  HPI  Pt reports discharge and recurrent yeast, she used OTC azo. She has been on diflucan few times. She also got some flagyl as well. She also reports dryness and pain with intercourse.       GYN & OB History  Patient's last menstrual period was 2015.   Date of Last Pap: 2015    OB History    Para Term  AB SAB TAB Ectopic Multiple Living   5 5 4 1 0 0 0 0 0 5      # Outcome Date GA Lbr Cristian/2nd Weight Sex Delivery Anes PTL Lv   5 Term      Vag-Spont   Y   4 Term      Vag-Spont   Y   3 Term      Vag-Spont   Y   2       Vag-Spont   Y   1 Term      Vag-Spont   Y          Review of Systems  Review of Systems   Gastrointestinal: Negative for abdominal pain, constipation, diarrhea, nausea and vomiting.   Genitourinary: Positive for dyspareunia, vaginal discharge and vaginal odor. Negative for dysuria, frequency, pelvic pain, urgency, vaginal bleeding and postcoital bleeding.           Objective:    Physical Exam:   Constitutional: She is oriented to person, place, and time. She appears well-developed and well-nourished. No distress.    HENT:   Head: Normocephalic and atraumatic.    Eyes: EOM are normal.      Pulmonary/Chest: No respiratory distress.          Genitourinary: There is no rash, tenderness, lesion or injury on the right labia. There is no rash, tenderness, lesion or injury on the left labia. Uterus is absent. Right adnexum displays no mass, no tenderness and no fullness. Left adnexum displays no mass, no tenderness and no fullness. No erythema, tenderness or bleeding in the vagina. Vaginal discharge found. Cervix exhibits absence.               Neurological: She is alert and oriented to person, place, and time.     Psychiatric: She has a normal mood and affect. Her behavior is normal.          Assessment:         1. Vulvovaginitis    2. Vaginal discharge              Plan:      1. Vulvovaginitis  - Vaginal dryness:  Use REPLENS or REFRESH OTC: 1/2 applicator full in vagina twice a week. Lubrication with intercourse:  Use astroglide instead of KY -- KY is drying.   Can also use olive oil, not extra virgin. Very small amount for intercourse. A little bit goes a LONG way.    - Vaginosis Screen by DNA Probe    2. Vaginal discharge  - Vaginosis Screen by DNA Probe       Return if symptoms worsen or fail to improve.

## 2017-02-23 DIAGNOSIS — B96.89 BV (BACTERIAL VAGINOSIS): Primary | ICD-10-CM

## 2017-02-23 DIAGNOSIS — N76.0 BV (BACTERIAL VAGINOSIS): Primary | ICD-10-CM

## 2017-02-23 LAB
CANDIDA RRNA VAG QL PROBE: NEGATIVE
G VAGINALIS RRNA GENITAL QL PROBE: POSITIVE
T VAGINALIS RRNA GENITAL QL PROBE: NEGATIVE

## 2017-02-23 RX ORDER — METRONIDAZOLE 500 MG/1
500 TABLET ORAL EVERY 12 HOURS
Qty: 14 TABLET | Refills: 0 | Status: SHIPPED | OUTPATIENT
Start: 2017-02-23 | End: 2017-03-02

## 2017-03-17 ENCOUNTER — HOSPITAL ENCOUNTER (OUTPATIENT)
Dept: RADIOLOGY | Facility: HOSPITAL | Age: 37
Discharge: HOME | End: 2017-03-17
Attending: SURGERY
Payer: MEDICAID

## 2017-03-17 DIAGNOSIS — R10.9 ABDOMINAL PAIN, UNSPECIFIED LOCATION: ICD-10-CM

## 2017-03-17 LAB
CREAT SERPL-MCNC: 1 MG/DL (ref 0.5–1.4)
SAMPLE: NORMAL

## 2017-03-17 PROCEDURE — 74177 CT ABD & PELVIS W/CONTRAST: CPT | Mod: 26,,, | Performed by: RADIOLOGY

## 2017-03-17 PROCEDURE — 74177 CT ABD & PELVIS W/CONTRAST: CPT | Mod: TC

## 2017-03-17 PROCEDURE — 25500020 PHARM REV CODE 255: Performed by: SURGERY

## 2017-03-17 RX ADMIN — IOHEXOL 15 ML: 350 INJECTION, SOLUTION INTRAVENOUS at 02:03

## 2017-03-17 RX ADMIN — IOHEXOL 15 ML: 350 INJECTION, SOLUTION INTRAVENOUS at 03:03

## 2017-03-17 RX ADMIN — IOHEXOL 75 ML: 350 INJECTION, SOLUTION INTRAVENOUS at 06:03

## 2017-03-22 ENCOUNTER — TELEPHONE (OUTPATIENT)
Dept: FAMILY MEDICINE | Facility: CLINIC | Age: 37
End: 2017-03-22

## 2017-03-22 NOTE — TELEPHONE ENCOUNTER
----- Message from Modesta Angeles sent at 3/22/2017 11:14 AM CDT -----  Contact: self  Pt calling to request a refill of metronidazole (FLAGYL) 500 MG tablet. Please call pt to discuss to [phone].

## 2017-03-23 ENCOUNTER — OFFICE VISIT (OUTPATIENT)
Dept: FAMILY MEDICINE | Facility: CLINIC | Age: 37
End: 2017-03-23
Payer: MEDICAID

## 2017-03-23 DIAGNOSIS — N89.8 VAGINAL DISCHARGE: ICD-10-CM

## 2017-03-23 DIAGNOSIS — N76.1 CHRONIC VAGINITIS: Primary | ICD-10-CM

## 2017-03-23 PROCEDURE — 99214 OFFICE O/P EST MOD 30 MIN: CPT | Mod: S$PBB,,, | Performed by: NURSE PRACTITIONER

## 2017-03-23 PROCEDURE — 99999 PR PBB SHADOW E&M-EST. PATIENT-LVL III: CPT | Mod: PBBFAC,,, | Performed by: NURSE PRACTITIONER

## 2017-03-23 PROCEDURE — 99213 OFFICE O/P EST LOW 20 MIN: CPT | Mod: PBBFAC,PO | Performed by: NURSE PRACTITIONER

## 2017-03-23 RX ORDER — ALPRAZOLAM 0.5 MG/1
TABLET ORAL
Refills: 0 | COMMUNITY
Start: 2017-03-18 | End: 2017-06-28

## 2017-03-23 RX ORDER — FLUCONAZOLE 150 MG/1
150 TABLET ORAL ONCE
Qty: 2 TABLET | Refills: 0 | Status: SHIPPED | OUTPATIENT
Start: 2017-03-23 | End: 2017-05-15 | Stop reason: SDUPTHER

## 2017-03-23 RX ORDER — METRONIDAZOLE 500 MG/1
500 TABLET ORAL EVERY 12 HOURS
Qty: 14 TABLET | Refills: 0 | Status: SHIPPED | OUTPATIENT
Start: 2017-03-23 | End: 2017-03-30

## 2017-03-23 NOTE — PATIENT INSTRUCTIONS
Vaginal Infection: Bacterial Vaginosis  Both good and bad bacteria are present in a healthy vagina. Bacterial vaginosis (BV) occurs when these bacteria get out of balance. The numbers of good bacteria decrease. This allows the numbers of bad bacteria to increase and cause BV. In most cases, BV is not a serious problem.  Causes of bacterial vaginosis  The cause of BV is not clear. Douching may lead to it. Having sex with a new partner or more than 1 partner makes it more likely.  Symptoms of bacterial vaginosis  Symptoms of BV vary for each woman. Some women have few symptoms or none at all. If symptoms are present, they can include:  · Thin, milky white or gray or sometimes green discharge  · Unpleasant fishy odor  · Irritation, itching, and burning at opening of vagina which may indicate mixed vaginitis    · Burning or irritation with sex or urination which may indicate mixed vaginitis  Diagnosing bacterial vaginosis  Your health care provider will ask about your symptoms and health history. He or she will also do a pelvic exam. This is an exam of your vagina and cervix. A sample of vaginal fluid or discharge may be taken. This sample is checked for signs of BV.  Treating bacterial vaginosis  BV is often treated with antibiotics. They may be given in oral pill form or as a vaginal cream. To use these medications:  · Be sure to take all of your medication, even if your symptoms go away.  · If youre taking antibiotic pills, do not drink alcohol until youre finished with all of your medication.  · If youre using vaginal cream, apply it as directed. Be aware that the cream may make condoms and diaphragms less effective.  · Call your health care provider if symptoms do not go away within 4 days of starting treatment. Also call if you have a reaction to the medication.  Why treatment matters  Even if you have no symptoms or your symptoms are mild, BV should be treated. Untreated BV can lead to health problems such  as:  · Increased risk of  delivery if youre pregnant.  · Increased risk of complications after surgery on the reproductive organs.  · Possible increased risk of pelvic inflammatory disease (PID).   Date Last Reviewed: 2015 [employer]. [address] [place] [address], [place] [address]. All rights reserved. This information is not intended as a substitute for professional medical care. Always follow your healthcare professional's instructions.        Preventing Vaginal Infection  These steps can help you stay comfortable during treatment of a vaginal infection. They also help prevent vaginal infections in the future.  Keeping a healthy balance  Factors that change the normal balance in the vagina can lead to a vaginal infection. To help keep the balance normal, try these tips:  · Change out of wet bathing suits and damp exercise clothes as soon as possible. Yeast thrive in a warm, moist environment.  · Avoid wearing tight pants. Choose cotton underwear and pantyhose that have a cotton crotch. Cotton keeps you cooler and drier than synthetics.  · Don't douche unless directed by your health care provider. Douching can destroy friendly bacteria and change the vagina's normal balance.  · Wipe from front to back after using the toilet. This prevents bacteria from spreading from the anus to the vulva.  · Wash the vulva with mild, unscented soap or with plain water.  · Wash your diaphragm, spermicide applicators, and sex toys with mild soap and water after use. Dry them thoroughly before putting them away.  · Change tampons often (every 2 hours to 4 hours). Leaving a tampon in for too long may disrupt the balance of vaginal bacteria.  · Avoid vaginal sprays, scented toilet paper and soaps, and deodorant tampons or pads, which can cause vaginal irritation  Staying healthy overall  Good overall health can help you resist infection. To be healthier:  · Help protect yourself from STDs by using  latex condoms for intercourse. Ask your health care provider for more information about safer sex.  · Eat a variety of healthy foods.  · Exercise regularly.  · Get enough rest and sleep.  · Maintain a healthy weight. If you need to lose weight, ask your health care provider for advice on how to start.  Date Last Reviewed: 5/18/2015  © 2589-5914 [employer]. [address] [place] [address], [place] [address]. All rights reserved. This information is not intended as a substitute for professional medical care. Always follow your healthcare professional's instructions.        Vaginal Infection: Yeast (Candidiasis)  Yeast infection occurs when yeast in the vagina increase and start attacking the vaginal tissues. Yeast is a type of fungus. These infections are often caused by a type of yeast called Candida albicans. Other species of yeast can also cause infections. Factors that may make infection more likely include recent antibiotic use, douching, or increased sex. Yeast infections are more common in women who have diabetes, or are obese or pregnant, or have a weak immune system.  Symptoms of yeast infection  · Clumpy or thin, white discharge, which may look like cottage cheese  · No odor or minimal odor  · Severe vaginal itching or burning  · Burning with urination  · Swelling, redness of vulva  · Pain during sex  Treating yeast infection  Yeast infection is treated with a vaginal antifungal cream. In some cases, antifungal pills are prescribed instead. During treatment:  · Finish all of your medicine, even if your symptoms go away.  · Apply the cream before going to bed. Lie flat after applying so that it doesn't drip out.  · Do not douche or use tampons.  · Don't rely on a diaphragm or condoms, since the cream may weaken them.  · Avoid intercourse if advised by your healthcare provider.     Should I treat a yeast infection myself?  Discuss with your healthcare provider whether you should use over-the-counter  medicines to treat a yeast infection. Self-treatment may depend on whether:  · You've had a yeast infection in the past.  · You're at risk for STDs.  Call your healthcare provider if symptoms do not go away or come back after treatment.   Date Last Reviewed: 5/12/2015  © 1075-8250 [employer]. [address] [place] [address], [place] [address]. All rights reserved. This information is not intended as a substitute for professional medical care. Always follow your healthcare professional's instructions.

## 2017-03-23 NOTE — PROGRESS NOTES
Subjective:       Patient ID: Valerie Sung is a 36 y.o. female.    Chief Complaint: Vaginitis    Vaginal Discharge   The patient's primary symptoms include vaginal discharge. The patient's pertinent negatives include no pelvic pain. This is a chronic problem. The current episode started in the past 7 days. The problem occurs constantly. The problem has been gradually worsening. The vaginal discharge was watery, white, yellow and malodorous. There has been no bleeding. Nothing aggravates the symptoms. She has tried nothing for the symptoms. She is sexually active. No, her partner does not have an STD. She uses nothing for contraception.       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  [employer]     Social History Main Topics    Smoking status: Never Smoker    Smokeless tobacco: Never Used    Alcohol use No      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     Drug use: No    Sexual activity: Yes     Partners: Male     Birth control/ protection: Surgical     Other Topics Concern    Not on file     Social History Narrative    Recently  for about a year    Wants to try again for another baby       Past Surgical History:   Procedure Laterality Date    BREAST SURGERY      left breast - benign findings    BTL      BUNIONECTOMY      bilateral    ENDOMETRIAL ABLATION  2008    hemorroids      HERNIA REPAIR  2008    INGUINAL HERNIA REPAIR  2008    @ same time as endometrial ablation    left foot      left foot x3 addition surgeries 2013; 2011, 2009(bunionectomy)     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 Negative for pelvic pain, vaginal bleeding and vaginal pain.   All other systems reviewed and are negative.      Objective:   LMP 12/25/2015     Physical Exam   Constitutional: She is oriented to person, place, and time. She appears well-developed and well-nourished.   HENT:   Head: Normocephalic and atraumatic.   Cardiovascular: Normal rate, regular rhythm and normal heart sounds.    Pulmonary/Chest: Effort normal and breath sounds normal. No respiratory distress. She has no decreased breath sounds.   Neurological: She is alert and oriented to person, place, and time.   Skin: Skin is warm, dry and intact. No pallor.   Psychiatric: She has a normal mood and affect. Her speech is normal and behavior is normal.       Assessment:       1. Chronic vaginitis    2. Vaginal discharge        Plan:       Valerie was seen today for vaginitis.    Diagnoses and all orders for this visit:    Chronic vaginitis    Vaginal discharge  -     fluconazole (DIFLUCAN) 150 MG Tab; Take 1 tablet (150 mg total) by mouth once.  -     metronidazole (FLAGYL) 500 MG tablet; Take 1 tablet (500 mg total) by mouth every 12 (twelve) hours.  · Change out of wet bathing suits and damp exercise clothes as soon as possible. Yeast thrive in a warm, moist environment.  · Avoid wearing tight pants. Choose cotton underwear and pantyhose that have a cotton crotch. Cotton keeps you cooler and drier than synthetics.  · Don't douche unless directed by your health care provider. Douching can destroy friendly bacteria and change the vagina's normal balance.  · Wipe from front to back after using the toilet. This prevents bacteria from spreading from the anus to the vulva.  · Wash the vulva with mild, unscented soap or with plain water.  · Wash your diaphragm, spermicide applicators, and sex  "toys with mild soap and water after use. Dry them thoroughly before putting them away.  · Change tampons often (every 2 hours to 4 hours). Leaving a tampon in for too long may disrupt the balance of vaginal bacteria.  · Avoid vaginal sprays, scented toilet paper and soaps, and deodorant tampons or pads, which can cause vaginal irritation    Return if symptoms worsen or fail to improve.  "This note will not be shared with the patient."  "

## 2017-05-07 ENCOUNTER — PATIENT MESSAGE (OUTPATIENT)
Dept: FAMILY MEDICINE | Facility: CLINIC | Age: 37
End: 2017-05-07

## 2017-05-15 ENCOUNTER — PATIENT MESSAGE (OUTPATIENT)
Dept: FAMILY MEDICINE | Facility: CLINIC | Age: 37
End: 2017-05-15

## 2017-05-15 DIAGNOSIS — B37.9 YEAST INFECTION: Primary | ICD-10-CM

## 2017-05-15 RX ORDER — FLUCONAZOLE 150 MG/1
150 TABLET ORAL ONCE
Qty: 2 TABLET | Refills: 0 | Status: SHIPPED | OUTPATIENT
Start: 2017-05-15 | End: 2017-05-15

## 2017-05-16 ENCOUNTER — PATIENT MESSAGE (OUTPATIENT)
Dept: FAMILY MEDICINE | Facility: CLINIC | Age: 37
End: 2017-05-16

## 2017-05-16 RX ORDER — METRONIDAZOLE 500 MG/1
500 TABLET ORAL EVERY 12 HOURS
Qty: 14 TABLET | Refills: 0 | Status: SHIPPED | OUTPATIENT
Start: 2017-05-16 | End: 2017-05-23

## 2017-06-23 ENCOUNTER — PATIENT MESSAGE (OUTPATIENT)
Dept: FAMILY MEDICINE | Facility: CLINIC | Age: 37
End: 2017-06-23

## 2017-06-28 ENCOUNTER — PATIENT MESSAGE (OUTPATIENT)
Dept: FAMILY MEDICINE | Facility: CLINIC | Age: 37
End: 2017-06-28

## 2017-06-28 ENCOUNTER — OFFICE VISIT (OUTPATIENT)
Dept: FAMILY MEDICINE | Facility: CLINIC | Age: 37
End: 2017-06-28
Payer: MEDICAID

## 2017-06-28 ENCOUNTER — TELEPHONE (OUTPATIENT)
Dept: FAMILY MEDICINE | Facility: CLINIC | Age: 37
End: 2017-06-28

## 2017-06-28 VITALS
DIASTOLIC BLOOD PRESSURE: 80 MMHG | HEART RATE: 68 BPM | TEMPERATURE: 98 F | BODY MASS INDEX: 22.45 KG/M2 | OXYGEN SATURATION: 98 % | HEIGHT: 67 IN | SYSTOLIC BLOOD PRESSURE: 106 MMHG | WEIGHT: 143.06 LBS

## 2017-06-28 DIAGNOSIS — N94.9 LABIAL BURNING: ICD-10-CM

## 2017-06-28 DIAGNOSIS — N89.8 VAGINAL DISCHARGE: Primary | ICD-10-CM

## 2017-06-28 LAB
BILIRUB SERPL-MCNC: NEGATIVE MG/DL
BLOOD URINE, POC: NEGATIVE
COLOR, POC UA: 1
GLUCOSE UR QL STRIP: NEGATIVE
KETONES UR QL STRIP: NEGATIVE
LEUKOCYTE ESTERASE URINE, POC: NEGATIVE
NITRITE, POC UA: NEGATIVE
PH, POC UA: NEGATIVE
PROTEIN, POC: NEGATIVE
SPECIFIC GRAVITY, POC UA: 5
UROBILINOGEN, POC UA: NEGATIVE

## 2017-06-28 PROCEDURE — 99214 OFFICE O/P EST MOD 30 MIN: CPT | Mod: S$PBB,,, | Performed by: NURSE PRACTITIONER

## 2017-06-28 PROCEDURE — 87480 CANDIDA DNA DIR PROBE: CPT

## 2017-06-28 PROCEDURE — 99999 PR PBB SHADOW E&M-EST. PATIENT-LVL III: CPT | Mod: PBBFAC,,, | Performed by: NURSE PRACTITIONER

## 2017-06-28 PROCEDURE — 99213 OFFICE O/P EST LOW 20 MIN: CPT | Mod: PBBFAC,PO | Performed by: NURSE PRACTITIONER

## 2017-06-28 PROCEDURE — 87591 N.GONORRHOEAE DNA AMP PROB: CPT

## 2017-06-28 PROCEDURE — 81002 URINALYSIS NONAUTO W/O SCOPE: CPT | Mod: PBBFAC,PO | Performed by: NURSE PRACTITIONER

## 2017-06-28 RX ORDER — BACLOFEN 10 MG/1
TABLET ORAL
Refills: 2 | COMMUNITY
Start: 2017-06-05 | End: 2017-06-28

## 2017-06-28 NOTE — PATIENT INSTRUCTIONS
Atrophic Vaginitis    Atrophic vaginitis means the walls of your vagina have become thin. This happens when your body makes too little of the hormone estrogen. Menopause or surgical removal of the ovaries are the most common causes for a drop in estrogen. Breastfeeding can also cause the hormone level to drop.  Symptoms of atrophic vaginitis include:  · Dryness, soreness, burning, or itching in the vagina  · Vaginal discharge  Sex can be uncomfortable, even painful. After sex, you may have bleeding from your vagina. You may also have burning or pain when you urinate.  Home care  Your health care provider may recommend 1 or more of these as treatment:  · Vaginal creams, lotions, and lubricants. These products help relieve vaginal dryness. They dont need a prescription. They can be found in the personal care section of most drugstores. Creams and lotions are used daily to help keep the vagina moist. Lubricants help reduce dryness and pain during sex. Choose water-based lubricants. Dont use petroleum jelly, mineral oil, or other oils. These increase the chance of infection.   · Hormone therapy (HT). HT increases the amount of estrogen in your body. This can help manage or relieve symptoms. HT can be given in pill form. It may be given as a lotion, cream, ring put into the vagina, or a patch on the skin. The risks and benefits of HT vary for each woman. For instance, your risk may be higher if you have had breast cancer. Discuss this treatment with your health care provider. Not every woman can use HT.  You dont need to abstain from sex. In fact, regular sex can help keep vaginal tissues healthy. Take steps to make sex more comfortable by using water-based lubricants.  Preventing infections  Atrophic vaginitis makes an infection of the vagina or the urinary tract more likely. To help reduce your risk:  · Keep your genitals clean. When you bathe, wash the outside of your vagina with mild soap and water. Clean gently  between the folds of your vagina.  · Wipe from front to back after a bowel movement.  · Dont douche unless your health care provider tells you to.  · Avoid scented toilet paper, scented vaginal sprays, and scented tampons.  · Avoid wearing clothes that are tight in the crotch. These include pantyhose, jeans, and leggings. Wear cotton underwear. Change it every day.  Follow-up care  Follow up with your health care provider, or as advised.  When to seek medical advice  Call your health care provider right away if any of these occur:  · Fever of 100.4°F (38°C) or higher, or as directed by your health care provider  · Symptoms dont go away or get worse even with treatment  · Vaginal area swells or becomes painful  · Vaginal area bleeds, but not because of your period  · Bad-smelling discharge from the vagina  · Pain or burning when you urinate or you have trouble passing urine  · Open sores develop around vagina   Date Last Reviewed: 12/23/2014  © 6029-6917 [employer]. [address] [place] [address]. All rights reserved. This information is not intended as a substitute for professional medical care. Always follow your healthcare professional's instructions.        Preventing Vaginitis     Use mild, unscented soap when you bathe or shower to avoid irritating your vagina.    Vaginitis is irritation or infection of the vagina or vulva (the outside opening of the vagina). Vaginitis can be caused by bacteria, viruses, parasites, or yeast. Chemicals (such as in perfumes or soaps or in spermicides) can sometimes be a cause. Vaginitis can be caused by hormone changes in pregnancy or with menopause. You can help prevent vaginitis. Follow the tips below. And see your health care provider if you have any symptoms.  Hygiene  · Avoid chemicals. Do not use vaginal sprays. Do not use scented toilet paper or tampons that are scented. Sprays and scents have chemicals that can irritate your vagina.  · Do not  douche unless you are told to by your health care provider. Douching is rarely needed. And it upsets the normal balance in the vagina.  · Wash yourself well. Wash the outer vaginal area (vulva) every day with mild, unscented soap. Keep it as dry as possible.  · Wipe correctly. Make sure to wipe from front to back after a bowel movement. This helps keep from spreading bacteria from your anus to your vagina.  · Change your tampon often. During your period, make sure to change your tampon as often as directed on the package. This allows the normal flow of vaginal discharge and blood.  Lifestyle  · Limit your number of sexual partners. The more partners you have, the greater your risk of infection. Using condoms helps reduce your risk.  · Get enough sleep. Sleep helps keep your bodys immune system healthy. This helps you fight infection.  · Lose weight, if needed. Excess weight can reduce air circulation around your vagina. This can increase your risk of infection.  · Exercise regularly. Regular activity helps keep your body healthy.  · Take antibiotics only as directed. Antibiotics can change the normal chemical balance in the vagina.    Clothing  · Dont sit in wet clothes. Yeast thrives when its warm and damp.  · Dont wear tight pants. And dont wear tights, leggings, or hose without a cotton crotch. These types of clothing trap warmth and moisture.  · Wear cotton underwear. Cotton lets air circulate around the vagina.  Symptoms of vaginitis  · Irritation, swelling, or itching of the genital area  · Vaginal discharge  · Bad vaginal odor  · Pain or burning during urination   Date Last Reviewed: 5/10/2015  © 3099-0424 [employer]. [address] [place] [address], [place] [address]. All rights reserved. This information is not intended as a substitute for professional medical care. Always follow your healthcare professional's instructions.        Preventing Vaginal Infection  These steps can help you stay  comfortable during treatment of a vaginal infection. They also help prevent vaginal infections in the future.  Keeping a healthy balance  Factors that change the normal balance in the vagina can lead to a vaginal infection. To help keep the balance normal, try these tips:  · Change out of wet bathing suits and damp exercise clothes as soon as possible. Yeast thrive in a warm, moist environment.  · Avoid wearing tight pants. Choose cotton underwear and pantyhose that have a cotton crotch. Cotton keeps you cooler and drier than synthetics.  · Don't douche unless directed by your health care provider. Douching can destroy friendly bacteria and change the vagina's normal balance.  · Wipe from front to back after using the toilet. This prevents bacteria from spreading from the anus to the vulva.  · Wash the vulva with mild, unscented soap or with plain water.  · Wash your diaphragm, spermicide applicators, and sex toys with mild soap and water after use. Dry them thoroughly before putting them away.  · Change tampons often (every 2 hours to 4 hours). Leaving a tampon in for too long may disrupt the balance of vaginal bacteria.  · Avoid vaginal sprays, scented toilet paper and soaps, and deodorant tampons or pads, which can cause vaginal irritation  Staying healthy overall  Good overall health can help you resist infection. To be healthier:  · Help protect yourself from STDs by using latex condoms for intercourse. Ask your health care provider for more information about safer sex.  · Eat a variety of healthy foods.  · Exercise regularly.  · Get enough rest and sleep.  · Maintain a healthy weight. If you need to lose weight, ask your health care provider for advice on how to start.  Date Last Reviewed: 5/18/2015  © 4682-3973 [employer]. [address] [place] [address], [place] [address]. All rights reserved. This information is not intended as a substitute for professional medical care. Always follow your healthcare  professional's instructions.

## 2017-06-28 NOTE — TELEPHONE ENCOUNTER
----- Message from Janis Truong sent at 6/27/2017  4:47 PM CDT -----  Contact: Self/[phone]  Patient would like to schedule an appointment. She was informed to call the office to schedule an appointment for tomorrow. Thank you.

## 2017-06-28 NOTE — PROGRESS NOTES
Subjective:       Patient ID: Valerie Sung is a 36 y.o. female.    Chief Complaint: Vaginal Discharge (burning)    Vaginal Discharge   The patient's primary symptoms include genital itching and vaginal discharge. The patient's pertinent negatives include no genital odor or vaginal bleeding. This is a recurrent problem. The current episode started in the past 7 days. The problem occurs intermittently. The problem has been waxing and waning. Associated symptoms include headaches. Pertinent negatives include no constipation, diarrhea, dysuria, hematuria or vomiting. The vaginal discharge was thick and white. There has been no bleeding. Nothing aggravates the symptoms. She has tried nothing for the symptoms. She is sexually active. She uses nothing for contraception.       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  [employer]     Social History Main Topics    Smoking status: Never Smoker    Smokeless tobacco: Never Used    Alcohol use No      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     Drug use: No    Sexual activity: Yes     Partners: Male     Birth control/ protection: Surgical     Other Topics Concern    Not on file     Social History Narrative    Recently  for about a year    Wants to try again for another baby       Past Surgical History:   Procedure Laterality Date    BREAST SURGERY      left breast - benign findings    BTL      BUNIONECTOMY      bilateral    ENDOMETRIAL ABLATION  2008    hemorroids      HERNIA REPAIR  2008    INGUINAL HERNIA REPAIR   "2008    @ same time as endometrial ablation    left foot      left foot x3 addition surgeries 2013; 2011, 2009(bunionectomy)    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   HENT: Positive for rhinorrhea. Negative for hearing loss and trouble swallowing.    Eyes: Negative for discharge and visual disturbance.   Respiratory: Negative for chest tightness and wheezing.    Cardiovascular: Negative for chest pain and palpitations.   Gastrointestinal: Negative for blood in stool, constipation, diarrhea and vomiting.   Endocrine: Negative for polydipsia and polyuria.   Genitourinary: Positive for vaginal discharge. Negative for difficulty urinating, dysuria, hematuria and menstrual problem.   Musculoskeletal: Negative for arthralgias, joint swelling and neck pain.   Neurological: Positive for headaches. Negative for weakness.   Psychiatric/Behavioral: Negative for confusion and dysphoric mood.   All other systems reviewed and are negative.      Objective:   /80 (BP Location: Left arm, Patient Position: Sitting, BP Method: Manual)   Pulse 68   Temp 98.4 °F (36.9 °C) (Oral)   Ht 5' 7" (1.702 m)   Wt 64.9 kg (143 lb 1.3 oz)   LMP 12/25/2015   SpO2 98%   BMI 22.41 kg/m²      Physical Exam   Constitutional: She appears well-developed and well-nourished.   Genitourinary: Cervix exhibits discharge. No erythema, tenderness or bleeding in the vagina. No signs of injury around the vagina. Vaginal discharge found.   Genitourinary Comments: + redness noted bilaterally to labia   Skin: She is not diaphoretic. No pallor.   Psychiatric: She has a normal mood and affect. Her speech is normal and behavior is normal.       Recent Results (from the past 24 hour(s))   POCT urine dipstick without microscope    Collection Time: 06/28/17  1:53 PM   Result Value Ref Range    Color, " UA 1.005     Spec Grav UA 5     pH, UA negative     WBC, UA negative     Nitrite, UA negative     Protein negative     Glucose, UA negative     Ketones, UA negative     Urobilinogen, UA negative     Bilirubin negative     Blood, UA negative        Assessment:       1. Vaginal discharge    2. Labial burning        Plan:       Valerie was seen today for vaginal discharge.    Diagnoses and all orders for this visit:    Vaginal discharge  -     Vaginosis Screen by DNA Probe  -     C. trachomatis/N. gonorrhoeae by AMP DNA Urine    Labial burning  -     POCT urine dipstick without microscope      Patient encouraged not to shave twice a week.   Will follow up when results available.   Return if symptoms worsen or fail to improve.

## 2017-06-29 ENCOUNTER — PATIENT MESSAGE (OUTPATIENT)
Dept: FAMILY MEDICINE | Facility: CLINIC | Age: 37
End: 2017-06-29

## 2017-06-29 LAB
C TRACH DNA SPEC QL NAA+PROBE: NOT DETECTED
CANDIDA RRNA VAG QL PROBE: NEGATIVE
G VAGINALIS RRNA GENITAL QL PROBE: POSITIVE
N GONORRHOEA DNA SPEC QL NAA+PROBE: NOT DETECTED
T VAGINALIS RRNA GENITAL QL PROBE: NEGATIVE

## 2017-06-29 RX ORDER — METRONIDAZOLE 500 MG/1
500 TABLET ORAL EVERY 12 HOURS
Qty: 14 TABLET | Refills: 0 | Status: SHIPPED | OUTPATIENT
Start: 2017-06-29 | End: 2017-07-06

## 2017-07-25 ENCOUNTER — PATIENT MESSAGE (OUTPATIENT)
Dept: FAMILY MEDICINE | Facility: CLINIC | Age: 37
End: 2017-07-25

## 2017-07-26 ENCOUNTER — PATIENT MESSAGE (OUTPATIENT)
Dept: FAMILY MEDICINE | Facility: CLINIC | Age: 37
End: 2017-07-26

## 2017-07-26 RX ORDER — METRONIDAZOLE 7.5 MG/G
1 GEL VAGINAL DAILY
Qty: 70 G | Refills: 0 | Status: SHIPPED | OUTPATIENT
Start: 2017-07-26 | End: 2018-02-22 | Stop reason: SDUPTHER

## 2017-12-04 ENCOUNTER — OFFICE VISIT (OUTPATIENT)
Dept: URGENT CARE | Facility: CLINIC | Age: 37
End: 2017-12-04
Payer: MEDICAID

## 2017-12-04 VITALS
HEART RATE: 70 BPM | DIASTOLIC BLOOD PRESSURE: 79 MMHG | OXYGEN SATURATION: 100 % | TEMPERATURE: 99 F | BODY MASS INDEX: 22.76 KG/M2 | WEIGHT: 145 LBS | HEIGHT: 67 IN | SYSTOLIC BLOOD PRESSURE: 107 MMHG

## 2017-12-04 DIAGNOSIS — J04.0 LARYNGITIS: ICD-10-CM

## 2017-12-04 DIAGNOSIS — H10.33 ACUTE BACTERIAL CONJUNCTIVITIS OF BOTH EYES: ICD-10-CM

## 2017-12-04 DIAGNOSIS — J40 BRONCHITIS: ICD-10-CM

## 2017-12-04 DIAGNOSIS — J01.10 ACUTE FRONTAL SINUSITIS, RECURRENCE NOT SPECIFIED: Primary | ICD-10-CM

## 2017-12-04 PROCEDURE — 99214 OFFICE O/P EST MOD 30 MIN: CPT | Mod: S$GLB,,, | Performed by: FAMILY MEDICINE

## 2017-12-04 RX ORDER — CEFDINIR 300 MG/1
300 CAPSULE ORAL 2 TIMES DAILY
COMMUNITY
End: 2018-06-27

## 2017-12-04 RX ORDER — CETIRIZINE HYDROCHLORIDE 10 MG/1
10 TABLET ORAL DAILY
COMMUNITY
End: 2018-06-27

## 2017-12-04 RX ORDER — CODEINE PHOSPHATE AND GUAIFENESIN 10; 100 MG/5ML; MG/5ML
10 SOLUTION ORAL EVERY 8 HOURS PRN
Qty: 120 ML | Refills: 0 | Status: SHIPPED | OUTPATIENT
Start: 2017-12-04 | End: 2017-12-14

## 2017-12-04 RX ORDER — CLINDAMYCIN PHOSPHATE 150 MG/ML
300 INJECTION, SOLUTION INTRAVENOUS
Status: COMPLETED | OUTPATIENT
Start: 2017-12-04 | End: 2017-12-04

## 2017-12-04 RX ORDER — DEXAMETHASONE SODIUM PHOSPHATE 100 MG/10ML
5 INJECTION INTRAMUSCULAR; INTRAVENOUS ONCE
Status: COMPLETED | OUTPATIENT
Start: 2017-12-04 | End: 2017-12-04

## 2017-12-04 RX ORDER — ALBUTEROL SULFATE 0.83 MG/ML
2.5 SOLUTION RESPIRATORY (INHALATION) EVERY 6 HOURS PRN
COMMUNITY
End: 2018-06-27

## 2017-12-04 RX ORDER — AZELASTINE 1 MG/ML
1 SPRAY, METERED NASAL 2 TIMES DAILY
COMMUNITY
End: 2018-06-27

## 2017-12-04 RX ORDER — BENZONATATE 100 MG/1
100 CAPSULE ORAL 3 TIMES DAILY PRN
COMMUNITY
End: 2018-06-27

## 2017-12-04 RX ORDER — PREDNISONE 20 MG/1
40 TABLET ORAL DAILY
Qty: 10 TABLET | Refills: 0 | Status: SHIPPED | OUTPATIENT
Start: 2017-12-04 | End: 2017-12-09

## 2017-12-04 RX ORDER — OFLOXACIN 3 MG/ML
1 SOLUTION/ DROPS OPHTHALMIC 4 TIMES DAILY
Qty: 10 ML | Refills: 0 | Status: SHIPPED | OUTPATIENT
Start: 2017-12-04 | End: 2017-12-11

## 2017-12-04 RX ADMIN — CLINDAMYCIN PHOSPHATE 300 MG: 150 INJECTION, SOLUTION INTRAVENOUS at 10:12

## 2017-12-04 RX ADMIN — DEXAMETHASONE SODIUM PHOSPHATE 5 MG: 100 INJECTION INTRAMUSCULAR; INTRAVENOUS at 10:12

## 2017-12-04 NOTE — PATIENT INSTRUCTIONS
Bronchitis with Wheezing (Viral or Bacterial: Adult)    Bronchitis is an infection of the air passages. It often occurs during a cold and is usually caused by a virus. Symptoms include cough with mucus (phlegm) and low-grade fever. This illness is contagious during the first few days and is spread through the air by coughing and sneezing, or by direct contact (touching the sick person and then touching your own eyes, nose, or mouth).  If there is a lot of inflammation, air flow is restricted. The air passages may also go into spasm, especially if you have asthma. This causes wheezing and difficulty breathing even in people who do not have asthma.  Bronchitis usually lasts 7 to 14 days. The wheezing should improve with treatment during the first week. An inhaler is often prescribed to relax the air passages and stop wheezing. Antibiotics will be prescribed if your doctor thinks there is also a secondary bacterial infection.  Home care  · If symptoms are severe, rest at home for the first 2 to 3 days. When you go back to your usual activities, don't let yourself get too tired.  · Do not smoke. Also avoid being exposed to secondhand smoke.  · You may use over-the-counter medicine to control fever or pain, unless another medicine was prescribed. Note: If you have chronic liver or kidney disease or have ever had a stomach ulcer or gastrointestinal bleeding, talk with your healthcare provider before using these medicines. Also talk to your provider if you are taking medicine to prevent blood clots.) Aspirin should never be given to anyone younger than 18 years of age who is ill with a viral infection or fever. It may cause severe liver or brain damage.  · Your appetite may be poor, so a light diet is fine. Avoid dehydration by drinking 6 to 8 glasses of fluids per day (such as water, soft drinks, sports drinks, juices, tea, or soup). Extra fluids will help loosen secretions in the nose and lungs.  · Over-the-counter  cough, cold, and sore-throat medicines will not shorten the length of the illness, but they may be helpful to reduce symptoms. (Note: Do not use decongestants if you have high blood pressure.)  · If you were given an inhaler, use it exactly as directed. If you need to use it more often than prescribed, your condition may be worsening. If this happens, contact your healthcare provider.  · If prescribed, finish all antibiotic medicine, even if you are feeling better after only a few days.  Follow-up care  Follow up with your healthcare provider, or as advised. If you had an X-ray or ECG (electrocardiogram), a specialist will review it. You will be notified of any new findings that may affect your care.  Note: If you are age 65 or older, or if you have a chronic lung disease or condition that affects your immune system, or you smoke, talk to your healthcare provider about having a pneumococcal vaccinations and a yearly influenza vaccination (flu shot).  When to seek medical advice  Call your healthcare provider right away if any of these occur:  · Fever of 100.4°F (38°C) or higher  · Coughing up increasing amounts of colored sputum  · Weakness, drowsiness, headache, facial pain, ear pain, or a stiff neck  Call 911, or get immediate medical care  Contact emergency services right away if any of these occur.  · Coughing up blood  · Worsening weakness, drowsiness, headache, or stiff neck  · Increased wheezing not helped with medication, shortness of breath, or pain with breathing  Date Last Reviewed: 9/13/2015  © 7802-2658 [employer]. [address] [place] [address], [place] [address]. All rights reserved. This information is not intended as a substitute for professional medical care. Always follow your healthcare professional's instructions.        Laryngitis    Laryngitis is a swelling of the tissues around the vocal cords. Symptoms include a hoarse (scratchy) voice. The voice may be lost completely. It may be  caused by a viral illness, such as a head or chest cold. It may also be due to overuse and strain of the voice. Smoking, drinking alcohol, acid reflux, allergies, or inhaling harsh chemicals may also lead to symptoms. This condition will usually resolve in 1-2 weeks.  Home care  · Rest your voice until it recovers. Talk as little as possible. If your symptoms are severe, rest at home for a day or so.  · Breathing cool steam from a humidifier/vaporizer or in a steamy shower may be helpful.  · Drink plenty of fluids to stay well hydrated.  · Do not smoke  Follow-up care  Follow up with your healthcare provider or this facility if you have not improved after one week.  When to seek medical advice  Contact your healthcare provider for any of the following:  · Severe pain with swallowing  · Trouble opening mouth  · Neck swelling, neck pain, or trouble moving neck  · Noisy breathing or trouble breathing  · Fever of 100.4°F (38.ºC) or higher, or as directed by your healthcare provider  · Drooling  · Symptoms do not resolve in 2 weeks  Date Last Reviewed: 4/26/2015  © 5035-1629 [employer]. [address] [place] [address]. All rights reserved. This information is not intended as a substitute for professional medical care. Always follow your healthcare professional's instructions.        Acute Sinusitis    Acute sinusitis is irritation and swelling of the sinuses. It is usually caused by a viral infection after a common cold. Your doctor can help you find relief.  What is acute sinusitis?  Sinuses are air-filled spaces in the skull behind the face. They are kept moist and clean by a lining of mucosa. Things such as pollen, smoke, and chemical fumes can irritate the mucosa. It can then swell up. As a response to irritation, the mucosa makes more mucus and other fluids. Tiny hairlike cilia cover the mucosa. Cilia help carry mucus toward the opening of the sinus. Too much mucus may cause the cilia to  stop working. This blocks the sinus opening. A buildup of fluid in the sinuses then causes pain and pressure. It can also encourage bacteria to grow in the sinuses.  Common symptoms of acute sinusitis  You may have:  · Facial soreness pain  · Headache  · Fever  · Fluid draining in the back of the throat (postnasal drip)  · Congestion  · Drainage that is thick and colored, instead of clear  · Cough  Diagnosing acute sinusitis  Your doctor will ask about your symptoms and health history. He or she will look at your ear, nose, and throat. You usually won't need to have X-rays taken.    The doctor may take a sample of mucus to check for bacteria. If you have sinusitis that keeps coming back, you may need imaging tests such as X-rays or CAT scans. This will help your doctor check for a structural problem that may be causing the infection.  Treating acute sinusitis  Treatment is aimed at unblocking the sinus opening and helping the cilia work again. You may need to take antihistamine and decongestant medicine. These can reduce inflammation and decrease the amount of fluid your sinuses make. If you have a bacterial infection, you will need to take antibiotic medicine for 10 to 14 days. Take this medicine until it is gone, even if you feel better.  Date Last Reviewed: 10/1/2016  © 4905-2235 [employer]. [address] [place] [address], [place] [address]. All rights reserved. This information is not intended as a substitute for professional medical care. Always follow your healthcare professional's instructions.

## 2017-12-04 NOTE — PROGRESS NOTES
"Subjective:       Patient ID: Valerie Sung is a 37 y.o. female.    Vitals:  height is 5' 7" (1.702 m) and weight is 65.8 kg (145 lb). Her temperature is 99 °F (37.2 °C). Her blood pressure is 107/79 and her pulse is 70. Her oxygen saturation is 100%.     Chief Complaint: URI    URI    This is a recurrent problem. The current episode started more than 1 month ago. The problem has been gradually worsening. Associated symptoms include chest pain, congestion, coughing, ear pain, headaches, sinus pain, a sore throat and wheezing. Pertinent negatives include no abdominal pain, nausea or sneezing. She has tried inhaler use, decongestant and antihistamine for the symptoms. The treatment provided no relief.     Review of Systems   Constitution: Negative for chills, fever and malaise/fatigue.   HENT: Positive for congestion, ear pain, hoarse voice, sinus pain and sore throat. Negative for sneezing.    Eyes: Negative for discharge and redness.   Cardiovascular: Positive for chest pain. Negative for dyspnea on exertion and leg swelling.   Respiratory: Positive for cough, shortness of breath and wheezing. Negative for sputum production.    Musculoskeletal: Positive for back pain. Negative for myalgias.   Gastrointestinal: Negative for abdominal pain and nausea.   Neurological: Positive for headaches.       Objective:      Physical Exam   Constitutional: She is oriented to person, place, and time. She appears well-developed and well-nourished. She is cooperative.  Non-toxic appearance. She does not appear ill. No distress.   HENT:   Head: Normocephalic and atraumatic.   Right Ear: Hearing, tympanic membrane, external ear and ear canal normal.   Left Ear: Hearing, tympanic membrane, external ear and ear canal normal.   Nose: Mucosal edema, rhinorrhea and sinus tenderness present. No nasal deformity. No epistaxis. Right sinus exhibits maxillary sinus tenderness and frontal sinus tenderness. Left sinus exhibits " maxillary sinus tenderness and frontal sinus tenderness.   Mouth/Throat: Uvula is midline and mucous membranes are normal. No trismus in the jaw. Normal dentition. No uvula swelling. Posterior oropharyngeal erythema present.   hoarseness   Eyes: Lids are normal. Left eye exhibits exudate. Right conjunctiva is injected. Left conjunctiva is injected. No scleral icterus.   Sclera clear bilat   Neck: Trachea normal, full passive range of motion without pain and phonation normal. Neck supple.   Cardiovascular: Normal rate, regular rhythm, normal heart sounds, intact distal pulses and normal pulses.    Pulmonary/Chest: Effort normal and breath sounds normal. No respiratory distress.   Abdominal: Soft. Normal appearance and bowel sounds are normal. She exhibits no distension. There is no tenderness.   Musculoskeletal: Normal range of motion. She exhibits no edema or deformity.   Neurological: She is alert and oriented to person, place, and time. She exhibits normal muscle tone. Coordination normal.   Skin: Skin is warm, dry and intact. She is not diaphoretic. No pallor.   Psychiatric: She has a normal mood and affect. Her speech is normal and behavior is normal. Judgment and thought content normal. Cognition and memory are normal.   Nursing note and vitals reviewed.      Assessment:       1. Acute frontal sinusitis, recurrence not specified    2. Laryngitis    3. Bronchitis    4. Acute bacterial conjunctivitis of both eyes        Plan:         Acute frontal sinusitis, recurrence not specified  -     dexamethasone injection 5 mg; Inject 0.5 mLs (5 mg total) into the muscle once.  -     clindamycin injection 300 mg; Inject 2 mLs (300 mg total) into the muscle one time.  -     predniSONE (DELTASONE) 20 MG tablet; Take 2 tablets (40 mg total) by mouth once daily.  Dispense: 10 tablet; Refill: 0  -     guaifenesin-codeine 100-10 mg/5 ml (CHERATUSSIN AC)  mg/5 mL syrup; Take 10 mLs by mouth every 8 (eight) hours as needed  for Cough.  Dispense: 120 mL; Refill: 0    Laryngitis    Bronchitis  -     clindamycin injection 300 mg; Inject 2 mLs (300 mg total) into the muscle one time.  -     guaifenesin-codeine 100-10 mg/5 ml (CHERATUSSIN AC)  mg/5 mL syrup; Take 10 mLs by mouth every 8 (eight) hours as needed for Cough.  Dispense: 120 mL; Refill: 0    Acute bacterial conjunctivitis of both eyes  -     ofloxacin (OCUFLOX) 0.3 % ophthalmic solution; Place 1 drop into both eyes 4 (four) times daily.  Dispense: 10 mL; Refill: 0      Patient Instructions     Bronchitis with Wheezing (Viral or Bacterial: Adult)    Bronchitis is an infection of the air passages. It often occurs during a cold and is usually caused by a virus. Symptoms include cough with mucus (phlegm) and low-grade fever. This illness is contagious during the first few days and is spread through the air by coughing and sneezing, or by direct contact (touching the sick person and then touching your own eyes, nose, or mouth).  If there is a lot of inflammation, air flow is restricted. The air passages may also go into spasm, especially if you have asthma. This causes wheezing and difficulty breathing even in people who do not have asthma.  Bronchitis usually lasts 7 to 14 days. The wheezing should improve with treatment during the first week. An inhaler is often prescribed to relax the air passages and stop wheezing. Antibiotics will be prescribed if your doctor thinks there is also a secondary bacterial infection.  Home care  · If symptoms are severe, rest at home for the first 2 to 3 days. When you go back to your usual activities, don't let yourself get too tired.  · Do not smoke. Also avoid being exposed to secondhand smoke.  · You may use over-the-counter medicine to control fever or pain, unless another medicine was prescribed. Note: If you have chronic liver or kidney disease or have ever had a stomach ulcer or gastrointestinal bleeding, talk with your healthcare  provider before using these medicines. Also talk to your provider if you are taking medicine to prevent blood clots.) Aspirin should never be given to anyone younger than 18 years of age who is ill with a viral infection or fever. It may cause severe liver or brain damage.  · Your appetite may be poor, so a light diet is fine. Avoid dehydration by drinking 6 to 8 glasses of fluids per day (such as water, soft drinks, sports drinks, juices, tea, or soup). Extra fluids will help loosen secretions in the nose and lungs.  · Over-the-counter cough, cold, and sore-throat medicines will not shorten the length of the illness, but they may be helpful to reduce symptoms. (Note: Do not use decongestants if you have high blood pressure.)  · If you were given an inhaler, use it exactly as directed. If you need to use it more often than prescribed, your condition may be worsening. If this happens, contact your healthcare provider.  · If prescribed, finish all antibiotic medicine, even if you are feeling better after only a few days.  Follow-up care  Follow up with your healthcare provider, or as advised. If you had an X-ray or ECG (electrocardiogram), a specialist will review it. You will be notified of any new findings that may affect your care.  Note: If you are age 65 or older, or if you have a chronic lung disease or condition that affects your immune system, or you smoke, talk to your healthcare provider about having a pneumococcal vaccinations and a yearly influenza vaccination (flu shot).  When to seek medical advice  Call your healthcare provider right away if any of these occur:  · Fever of 100.4°F (38°C) or higher  · Coughing up increasing amounts of colored sputum  · Weakness, drowsiness, headache, facial pain, ear pain, or a stiff neck  Call 911, or get immediate medical care  Contact emergency services right away if any of these occur.  · Coughing up blood  · Worsening weakness, drowsiness, headache, or stiff  neck  · Increased wheezing not helped with medication, shortness of breath, or pain with breathing  Date Last Reviewed: 9/13/2015  © 2146-8494 [employer]. [address] [place] [address]. All rights reserved. This information is not intended as a substitute for professional medical care. Always follow your healthcare professional's instructions.        Laryngitis    Laryngitis is a swelling of the tissues around the vocal cords. Symptoms include a hoarse (scratchy) voice. The voice may be lost completely. It may be caused by a viral illness, such as a head or chest cold. It may also be due to overuse and strain of the voice. Smoking, drinking alcohol, acid reflux, allergies, or inhaling harsh chemicals may also lead to symptoms. This condition will usually resolve in 1-2 weeks.  Home care  · Rest your voice until it recovers. Talk as little as possible. If your symptoms are severe, rest at home for a day or so.  · Breathing cool steam from a humidifier/vaporizer or in a steamy shower may be helpful.  · Drink plenty of fluids to stay well hydrated.  · Do not smoke  Follow-up care  Follow up with your healthcare provider or this facility if you have not improved after one week.  When to seek medical advice  Contact your healthcare provider for any of the following:  · Severe pain with swallowing  · Trouble opening mouth  · Neck swelling, neck pain, or trouble moving neck  · Noisy breathing or trouble breathing  · Fever of 100.4°F (38.ºC) or higher, or as directed by your healthcare provider  · Drooling  · Symptoms do not resolve in 2 weeks  Date Last Reviewed: 4/26/2015 © 2000-2017 [employer]. [address] [place] [address]. All rights reserved. This information is not intended as a substitute for professional medical care. Always follow your healthcare professional's instructions.        Acute Sinusitis    Acute sinusitis is irritation and swelling of the sinuses.  It is usually caused by a viral infection after a common cold. Your doctor can help you find relief.  What is acute sinusitis?  Sinuses are air-filled spaces in the skull behind the face. They are kept moist and clean by a lining of mucosa. Things such as pollen, smoke, and chemical fumes can irritate the mucosa. It can then swell up. As a response to irritation, the mucosa makes more mucus and other fluids. Tiny hairlike cilia cover the mucosa. Cilia help carry mucus toward the opening of the sinus. Too much mucus may cause the cilia to stop working. This blocks the sinus opening. A buildup of fluid in the sinuses then causes pain and pressure. It can also encourage bacteria to grow in the sinuses.  Common symptoms of acute sinusitis  You may have:  · Facial soreness pain  · Headache  · Fever  · Fluid draining in the back of the throat (postnasal drip)  · Congestion  · Drainage that is thick and colored, instead of clear  · Cough  Diagnosing acute sinusitis  Your doctor will ask about your symptoms and health history. He or she will look at your ear, nose, and throat. You usually won't need to have X-rays taken.    The doctor may take a sample of mucus to check for bacteria. If you have sinusitis that keeps coming back, you may need imaging tests such as X-rays or CAT scans. This will help your doctor check for a structural problem that may be causing the infection.  Treating acute sinusitis  Treatment is aimed at unblocking the sinus opening and helping the cilia work again. You may need to take antihistamine and decongestant medicine. These can reduce inflammation and decrease the amount of fluid your sinuses make. If you have a bacterial infection, you will need to take antibiotic medicine for 10 to 14 days. Take this medicine until it is gone, even if you feel better.  Date Last Reviewed: 10/1/2016  © 8343-9946 [employer]. [address] [place] [address], [place] [address]. All rights reserved. This  information is not intended as a substitute for professional medical care. Always follow your healthcare professional's instructions.

## 2017-12-06 ENCOUNTER — TELEPHONE (OUTPATIENT)
Dept: URGENT CARE | Facility: CLINIC | Age: 37
End: 2017-12-06

## 2018-01-11 ENCOUNTER — TELEPHONE (OUTPATIENT)
Dept: FAMILY MEDICINE | Facility: CLINIC | Age: 38
End: 2018-01-11

## 2018-02-20 ENCOUNTER — OFFICE VISIT (OUTPATIENT)
Dept: OBSTETRICS AND GYNECOLOGY | Facility: CLINIC | Age: 38
End: 2018-02-20
Payer: MEDICAID

## 2018-02-20 VITALS
WEIGHT: 145.06 LBS | SYSTOLIC BLOOD PRESSURE: 120 MMHG | DIASTOLIC BLOOD PRESSURE: 78 MMHG | HEIGHT: 67 IN | BODY MASS INDEX: 22.77 KG/M2

## 2018-02-20 DIAGNOSIS — N89.8 VAGINAL DISCHARGE: Primary | ICD-10-CM

## 2018-02-20 DIAGNOSIS — N95.2 VAGINAL ATROPHY: ICD-10-CM

## 2018-02-20 DIAGNOSIS — M79.10 MUSCLE TENDERNESS: ICD-10-CM

## 2018-02-20 DIAGNOSIS — R10.2 PELVIC PAIN: ICD-10-CM

## 2018-02-20 PROCEDURE — 99999 PR PBB SHADOW E&M-EST. PATIENT-LVL III: CPT | Mod: PBBFAC,,, | Performed by: OBSTETRICS & GYNECOLOGY

## 2018-02-20 PROCEDURE — 87491 CHLMYD TRACH DNA AMP PROBE: CPT

## 2018-02-20 PROCEDURE — 87147 CULTURE TYPE IMMUNOLOGIC: CPT

## 2018-02-20 PROCEDURE — 3008F BODY MASS INDEX DOCD: CPT | Mod: ,,, | Performed by: OBSTETRICS & GYNECOLOGY

## 2018-02-20 PROCEDURE — 99213 OFFICE O/P EST LOW 20 MIN: CPT | Mod: S$PBB,,, | Performed by: OBSTETRICS & GYNECOLOGY

## 2018-02-20 PROCEDURE — 99213 OFFICE O/P EST LOW 20 MIN: CPT | Mod: PBBFAC | Performed by: OBSTETRICS & GYNECOLOGY

## 2018-02-20 PROCEDURE — 87186 SC STD MICRODIL/AGAR DIL: CPT

## 2018-02-20 PROCEDURE — 87086 URINE CULTURE/COLONY COUNT: CPT

## 2018-02-20 PROCEDURE — 87077 CULTURE AEROBIC IDENTIFY: CPT

## 2018-02-20 PROCEDURE — 87088 URINE BACTERIA CULTURE: CPT

## 2018-02-20 PROCEDURE — 87480 CANDIDA DNA DIR PROBE: CPT

## 2018-02-20 RX ORDER — ESTRADIOL 0.5 MG/1
0.5 TABLET ORAL DAILY
Qty: 30 TABLET | Refills: 11 | Status: SHIPPED | OUTPATIENT
Start: 2018-02-20 | End: 2018-05-17 | Stop reason: SDUPTHER

## 2018-02-20 RX ORDER — IBUPROFEN 600 MG/1
600 TABLET ORAL EVERY 6 HOURS
Qty: 60 TABLET | Refills: 0 | Status: SHIPPED | OUTPATIENT
Start: 2018-02-20 | End: 2018-03-07

## 2018-02-20 NOTE — PROGRESS NOTES
Subjective:       Patient ID: Valerie Rodriguez is a 37 y.o. female.    Chief Complaint:  Advice Only (lower pelvic pain )      History of Present Illness  HPI  Pt here reporting lower abdominal pain. It comes and goes. It is on the right. Reports that it feels like it did when she had the hernia. Hernia was repaired with mesh. CT scan showed not abdominal processes.   Reporting hot flashes/ night sweats. She is also having irritability. She has some dryness.     GYN & OB History  Patient's last menstrual period was 2015.   Date of Last Pap: 2015    OB History    Para Term  AB Living   5 5 4 1 0 5   SAB TAB Ectopic Multiple Live Births   0 0 0 0 5      # Outcome Date GA Lbr Cristian/2nd Weight Sex Delivery Anes PTL Lv   5 Term      Vag-Spont   JEANIE   4 Term      Vag-Spont   JEANIE   3 Term      Vag-Spont   JEANIE   2       Vag-Spont   JEANIE   1 Term      Vag-Spont   JEANIE          Review of Systems  Review of Systems   Gastrointestinal: Negative for abdominal pain.   Genitourinary: Positive for pelvic pain, vaginal discharge and vaginal odor. Negative for vaginal pain.           Objective:    Physical Exam:   Constitutional: She is oriented to person, place, and time. She appears well-developed and well-nourished. No distress.    HENT:   Head: Normocephalic and atraumatic.    Eyes: EOM are normal.      Pulmonary/Chest: No respiratory distress.          Genitourinary: There is no rash, tenderness, lesion or injury on the right labia. There is no rash, tenderness, lesion or injury on the left labia. Uterus is absent. Right adnexum displays tenderness. Right adnexum displays no mass and no fullness. Left adnexum displays no mass, no tenderness and no fullness. No erythema, tenderness or bleeding in the vagina. No foreign body in the vagina. No vaginal discharge found. Vaginal cuff normal.Cervix exhibits absence.   Genitourinary Comments: Dryness.            Musculoskeletal:        Right hip:  She exhibits tenderness. She exhibits normal range of motion, normal strength, no bony tenderness, no swelling, no crepitus and no deformity.        Legs:      Neurological: She is alert and oriented to person, place, and time.     Psychiatric: She has a normal mood and affect. Her behavior is normal.          Assessment:        1. Vaginal discharge    2. Pelvic pain    3. Vaginal atrophy    4. Muscle tenderness              Plan:      1. Vaginal discharge  - Vaginosis Screen by DNA Probe  - C. trachomatis/N. gonorrhoeae by AMP DNA Urine    2. Pelvic pain  - Based on exam-- I do not feel this is GYN in origin. She has tenderness along the inguinal canal and groin. Concern for muscle pain vs inguinal hernia with history of mesh repair unlikely.   - US Pelvis Comp with Transvag NON-OB (xpd; Future     3. Vaginal atrophy  - A full discussion of the benefit-risk ratio of hormonal replacement therapy was carried out. Improvement in vasomotor and other climacteric symptoms is discussed, including possible improvements in sleep and mood. Reduction of risk for osteoporosis was explained. We discussed the study data showing increased risk of thrombo-embolic events such as myocardial infarction, stroke and also possibly breast cancer with estrogen replacement, and how this might affect her. The range of side effects such as breast tenderness, weight gain and including possible increases in lifetime risk of breast cancer and possible thrombotic complications was discussed. We also discussed ACOG's recommendation to use hormone replacement therapy for the relief of hot flashes alone and to be on the lowest dose possible for the shortest amount of time.  Alternative such as herbal and soy-based products were reviewed. All of her questions about this therapy were answered.  - estradiol (ESTRACE) 0.5 MG tablet; Take 1 tablet (0.5 mg total) by mouth once daily.  Dispense: 30 tablet; Refill: 11    4. Muscle tenderness  - Ibuprofen  600mg Q6 hours for pain for 14 days. Follow up with PCP.        Follow-up if symptoms worsen or fail to improve.

## 2018-02-21 ENCOUNTER — PATIENT MESSAGE (OUTPATIENT)
Dept: OBSTETRICS AND GYNECOLOGY | Facility: CLINIC | Age: 38
End: 2018-02-21

## 2018-02-21 DIAGNOSIS — N30.00 ACUTE CYSTITIS WITHOUT HEMATURIA: Primary | ICD-10-CM

## 2018-02-21 LAB
C TRACH DNA SPEC QL NAA+PROBE: NOT DETECTED
CANDIDA RRNA VAG QL PROBE: POSITIVE
G VAGINALIS RRNA GENITAL QL PROBE: POSITIVE
N GONORRHOEA DNA SPEC QL NAA+PROBE: NOT DETECTED
T VAGINALIS RRNA GENITAL QL PROBE: NEGATIVE

## 2018-02-21 RX ORDER — LEVOFLOXACIN 500 MG/1
500 TABLET, FILM COATED ORAL DAILY
Qty: 7 TABLET | Refills: 0 | Status: SHIPPED | OUTPATIENT
Start: 2018-02-21 | End: 2018-02-28

## 2018-02-22 ENCOUNTER — TELEPHONE (OUTPATIENT)
Dept: OBSTETRICS AND GYNECOLOGY | Facility: CLINIC | Age: 38
End: 2018-02-22

## 2018-02-22 ENCOUNTER — TELEPHONE (OUTPATIENT)
Dept: RADIOLOGY | Facility: HOSPITAL | Age: 38
End: 2018-02-22

## 2018-02-22 DIAGNOSIS — B96.89 BV (BACTERIAL VAGINOSIS): Primary | ICD-10-CM

## 2018-02-22 DIAGNOSIS — B37.31 YEAST VAGINITIS: ICD-10-CM

## 2018-02-22 DIAGNOSIS — N76.0 BV (BACTERIAL VAGINOSIS): Primary | ICD-10-CM

## 2018-02-22 RX ORDER — METRONIDAZOLE 7.5 MG/G
1 GEL VAGINAL DAILY
Qty: 70 G | Refills: 0 | Status: SHIPPED | OUTPATIENT
Start: 2018-02-22 | End: 2018-03-16

## 2018-02-22 RX ORDER — FLUCONAZOLE 150 MG/1
150 TABLET ORAL
Qty: 3 TABLET | Refills: 0 | Status: SHIPPED | OUTPATIENT
Start: 2018-02-22 | End: 2018-03-01

## 2018-02-22 NOTE — TELEPHONE ENCOUNTER
Notes recorded by Zahira Rand MA on 2/22/2018 at 1:27 PM CST  Called patient, left message for patient to call office back at [phone].

## 2018-02-22 NOTE — TELEPHONE ENCOUNTER
----- Message from Mikaela Coleman MD sent at 2/22/2018  8:48 AM CST -----  Pt has BV and yeast. I sent rx to her pharmacy.

## 2018-02-23 ENCOUNTER — TELEPHONE (OUTPATIENT)
Dept: OBSTETRICS AND GYNECOLOGY | Facility: CLINIC | Age: 38
End: 2018-02-23

## 2018-02-23 ENCOUNTER — HOSPITAL ENCOUNTER (OUTPATIENT)
Dept: RADIOLOGY | Facility: HOSPITAL | Age: 38
Discharge: HOME | End: 2018-02-23
Attending: OBSTETRICS & GYNECOLOGY
Payer: MEDICAID

## 2018-02-23 DIAGNOSIS — R10.2 PELVIC PAIN: ICD-10-CM

## 2018-02-23 LAB
BACTERIA UR CULT: NORMAL
BACTERIA UR CULT: NORMAL

## 2018-02-23 PROCEDURE — 76830 TRANSVAGINAL US NON-OB: CPT | Mod: 26,,, | Performed by: RADIOLOGY

## 2018-02-23 PROCEDURE — 76856 US EXAM PELVIC COMPLETE: CPT | Mod: 26,,, | Performed by: RADIOLOGY

## 2018-02-23 PROCEDURE — 76830 TRANSVAGINAL US NON-OB: CPT | Mod: TC,PO

## 2018-02-23 NOTE — TELEPHONE ENCOUNTER
----- Message from Cherelle Paez sent at 2/23/2018  9:40 AM CST -----  Contact: self  Patient is returning a call and can be reached at [phone].      Thanks,      Attempted to return patients call, no answer. Left massage

## 2018-03-01 ENCOUNTER — TELEPHONE (OUTPATIENT)
Dept: OBSTETRICS AND GYNECOLOGY | Facility: CLINIC | Age: 38
End: 2018-03-01

## 2018-03-16 ENCOUNTER — PATIENT MESSAGE (OUTPATIENT)
Dept: OBSTETRICS AND GYNECOLOGY | Facility: CLINIC | Age: 38
End: 2018-03-16

## 2018-03-16 DIAGNOSIS — B37.9 YEAST INFECTION: ICD-10-CM

## 2018-03-16 DIAGNOSIS — B96.89 BV (BACTERIAL VAGINOSIS): Primary | ICD-10-CM

## 2018-03-16 DIAGNOSIS — N76.0 BV (BACTERIAL VAGINOSIS): Primary | ICD-10-CM

## 2018-03-16 RX ORDER — FLUCONAZOLE 150 MG/1
150 TABLET ORAL ONCE
Qty: 1 TABLET | Refills: 0 | Status: SHIPPED | OUTPATIENT
Start: 2018-03-16 | End: 2018-03-16

## 2018-03-16 RX ORDER — METRONIDAZOLE 500 MG/1
500 TABLET ORAL EVERY 12 HOURS
Qty: 14 TABLET | Refills: 0 | Status: SHIPPED | OUTPATIENT
Start: 2018-03-16 | End: 2018-03-23

## 2018-05-17 ENCOUNTER — OFFICE VISIT (OUTPATIENT)
Dept: OBSTETRICS AND GYNECOLOGY | Facility: CLINIC | Age: 38
End: 2018-05-17
Payer: MEDICAID

## 2018-05-17 VITALS
WEIGHT: 150.56 LBS | DIASTOLIC BLOOD PRESSURE: 76 MMHG | HEIGHT: 67 IN | SYSTOLIC BLOOD PRESSURE: 122 MMHG | BODY MASS INDEX: 23.63 KG/M2

## 2018-05-17 DIAGNOSIS — N95.1 MENOPAUSAL STATE: Primary | ICD-10-CM

## 2018-05-17 DIAGNOSIS — N95.2 VAGINAL ATROPHY: ICD-10-CM

## 2018-05-17 DIAGNOSIS — Z90.710 STATUS POST HYSTERECTOMY: ICD-10-CM

## 2018-05-17 DIAGNOSIS — N64.4 BREAST PAIN: ICD-10-CM

## 2018-05-17 DIAGNOSIS — N76.1 CHRONIC VAGINITIS: ICD-10-CM

## 2018-05-17 PROCEDURE — 87480 CANDIDA DNA DIR PROBE: CPT

## 2018-05-17 PROCEDURE — 99999 PR PBB SHADOW E&M-EST. PATIENT-LVL III: CPT | Mod: PBBFAC,,, | Performed by: OBSTETRICS & GYNECOLOGY

## 2018-05-17 PROCEDURE — 99213 OFFICE O/P EST LOW 20 MIN: CPT | Mod: PBBFAC | Performed by: OBSTETRICS & GYNECOLOGY

## 2018-05-17 PROCEDURE — 99395 PREV VISIT EST AGE 18-39: CPT | Mod: S$PBB,,, | Performed by: OBSTETRICS & GYNECOLOGY

## 2018-05-17 PROCEDURE — 87510 GARDNER VAG DNA DIR PROBE: CPT

## 2018-05-17 RX ORDER — ESTRADIOL 0.5 MG/1
0.5 TABLET ORAL DAILY
Qty: 30 TABLET | Refills: 11 | Status: SHIPPED | OUTPATIENT
Start: 2018-05-17 | End: 2019-08-29 | Stop reason: ALTCHOICE

## 2018-05-17 RX ORDER — METRONIDAZOLE 500 MG/1
500 TABLET ORAL 3 TIMES DAILY
Qty: 30 TABLET | Refills: 2 | Status: SHIPPED | OUTPATIENT
Start: 2018-05-17 | End: 2018-05-27

## 2018-05-17 RX ORDER — BACLOFEN 10 MG/1
TABLET ORAL
COMMUNITY
Start: 2018-02-27 | End: 2018-06-27

## 2018-05-17 NOTE — PROGRESS NOTES
Subjective:      Chief Complaint:    Chief Complaint   Patient presents with    Gynecologic Exam       Menstrual History:    OB History      Para Term  AB Living    5 5 4 1 0 5    SAB TAB Ectopic Multiple Live Births    0 0 0 0 5          Menarche age: 13     Patient's last menstrual period was 2015.            Objective:        History of Present Illness AND  Examination detailed DICTATE:     HISTORY OF PRESENT ILLNESS:  The patient is 37 years of age.   5, para 5.    The patient is here for annual examination.  Pap smear in , was negative.    The patient is taking Estrace 0.25 mg.  Complaining of some lower abdominal   pain.  Ultrasound and CT scan this year was all essentially within normal limit.    The patient had cystoscopy in the past because of hematuria.  The pain seems   to be suprapubic.  It is steady.  Medical problem, dyslipidemia, mitral valve   prolapse, glaucoma and in the past bacterial vaginosis.  Surgery, hysterectomy,   hernia repair, left kidney donation, tubal ligation, thermal ablation and breast   biopsy.  As stated, the patient's problem is suprapubic pain and discomfort.    REVIEW OF SYSTEMS:  HEAD, EARS, EYES, NOSE AND THROAT:  Negative.  CARDIORESPIRATORY:  Negative.  GASTROINTESTINAL:  Negative.  GENITOURINARY:  See present illness.  NEUROMUSCULAR:  No problem or complaint.    PHYSICAL EXAMINATION:  GENERAL:  Well-developed, well-nourished, alert, oriented female, not in acute   distress.  VITAL SIGNS:  Blood pressure is 122/76 and weight is 150.  HEAD:  Normocephalic.  EYES:  Reactive.  NECK:  Supple.  Thyroid is not palpable.  No nodes.  CHEST:  Clear.  HEART:  Regular sinus rhythm.  No murmur.  BREASTS:  No lumps, masses, discharge, skin changes, retraction or nipple   changes.  However, the patient's breast is tender to palpation and movement.    However, I do not detect or find any abnormality.  ABDOMEN:  Upper abdomen is normal.  Lower abdomen is  normal.  No guarding,   rebound or tenderness.  PELVIC:  External is normal.  Vulva is normal.  Bartholin's, urethral and   Chatsworth's glands are negative.  Vagina is clear.  Cervix, uterus and adnexa are   absent.  The patient seemed to have tenderness and pain along the trigone and   the bladder and the urethra.  My impression it is most likely related to bladder   problems.  I recommended the patient to go back to the urologist and reevaluate   the bladder.  Good apical support is noted.  RECTAL:  Negative.  MUSCULOSKELETAL:  Normal range of motion.  VASCULAR AND SKIN:  Blood vessels normal.  Skin is clear.    IMPRESSION:  Most likely problem with the bladder.    PLAN:  Continue with Estrace.  Vaginal culture was taken.  We will decide on   followup and treatment after the culture.  The patient's problem is that her   partner is not circumcised, so I discussed with her possible treatment and   cleanse everything prior to sexual activity.  Return to urologist to evaluate   the bladder.  Also, we will give the patient some metronidazole pills to start   one 3 times a day and depending on the culture, we will follow up and decide.    Also, continue with Estrace daily.      JONH/IN  dd: 05/17/2018 11:02:28 (CDT)  td: 05/17/2018 22:15:35 (CDT)  Doc ID   #[id number]  Job ID #[id number]    CC:              Assessment:      Diagnosis: ANNUAL   EXAM   VAG   DISCHARGE     MENOPAUSAL           Plan:      Return in 12  months

## 2018-05-17 NOTE — PATIENT INSTRUCTIONS

## 2018-06-15 ENCOUNTER — HOSPITAL ENCOUNTER (OUTPATIENT)
Dept: RADIOLOGY | Facility: HOSPITAL | Age: 38
Discharge: HOME | End: 2018-06-15
Attending: OBSTETRICS & GYNECOLOGY
Payer: MEDICAID

## 2018-06-15 VITALS — HEIGHT: 67 IN | BODY MASS INDEX: 23.63 KG/M2 | WEIGHT: 150.56 LBS

## 2018-06-15 DIAGNOSIS — N64.4 BREAST PAIN: ICD-10-CM

## 2018-06-15 PROCEDURE — 77063 BREAST TOMOSYNTHESIS BI: CPT | Mod: 26,,, | Performed by: RADIOLOGY

## 2018-06-15 PROCEDURE — 77067 SCR MAMMO BI INCL CAD: CPT | Mod: TC,PO

## 2018-06-15 PROCEDURE — 77067 SCR MAMMO BI INCL CAD: CPT | Mod: 26,,, | Performed by: RADIOLOGY

## 2018-06-19 ENCOUNTER — HOSPITAL ENCOUNTER (OUTPATIENT)
Dept: RADIOLOGY | Facility: HOSPITAL | Age: 38
Discharge: HOME | End: 2018-06-19
Attending: OBSTETRICS & GYNECOLOGY
Payer: MEDICAID

## 2018-06-19 DIAGNOSIS — R92.8 ABNORMAL MAMMOGRAM: ICD-10-CM

## 2018-06-19 PROCEDURE — 77065 DX MAMMO INCL CAD UNI: CPT | Mod: TC,PO,RT

## 2018-06-19 PROCEDURE — 77065 DX MAMMO INCL CAD UNI: CPT | Mod: 26,RT,, | Performed by: RADIOLOGY

## 2018-06-20 ENCOUNTER — TELEPHONE (OUTPATIENT)
Dept: OBSTETRICS AND GYNECOLOGY | Facility: CLINIC | Age: 38
End: 2018-06-20

## 2018-06-20 NOTE — TELEPHONE ENCOUNTER
Notes recorded by Meg Haywood LPN on 6/20/2018 at 9:45 AM CDT  SPOKE WITH PT , INFORMED HER THAT HER MMG ORDER WAS PUT IN FOR HER TO GO GET DONE, PT STATED SHE WENT AND GOT IT DONE YESTERDAY 6/19/18  ------

## 2018-06-27 ENCOUNTER — OFFICE VISIT (OUTPATIENT)
Dept: SURGERY | Facility: CLINIC | Age: 38
End: 2018-06-27
Payer: MEDICAID

## 2018-06-27 VITALS
HEART RATE: 70 BPM | SYSTOLIC BLOOD PRESSURE: 104 MMHG | DIASTOLIC BLOOD PRESSURE: 65 MMHG | TEMPERATURE: 98 F | BODY MASS INDEX: 24.29 KG/M2 | HEIGHT: 67 IN | WEIGHT: 154.75 LBS

## 2018-06-27 DIAGNOSIS — R92.8 ABNORMAL MAMMOGRAM OF RIGHT BREAST: Primary | ICD-10-CM

## 2018-06-27 PROCEDURE — 99213 OFFICE O/P EST LOW 20 MIN: CPT | Mod: PBBFAC | Performed by: SURGERY

## 2018-06-27 PROCEDURE — 99999 PR PBB SHADOW E&M-EST. PATIENT-LVL III: CPT | Mod: PBBFAC,,, | Performed by: SURGERY

## 2018-06-27 PROCEDURE — 99204 OFFICE O/P NEW MOD 45 MIN: CPT | Mod: S$PBB,,, | Performed by: SURGERY

## 2018-06-27 RX ORDER — ALBUTEROL SULFATE 90 UG/1
2 AEROSOL, METERED RESPIRATORY (INHALATION) EVERY 6 HOURS PRN
COMMUNITY
End: 2020-09-21 | Stop reason: SDUPTHER

## 2018-06-27 NOTE — LETTER
June 27, 2018      Humberto Carrillo MD  [address] [place] [address]  [address]  [facility] [address]           [facility]  [address] [place] [id number]  Phone: [phone]  Fax: [phone]          Patient: Valerie Rodriguez   MR Number: [id number]   YOB: 1980   Date of Visit: 6/27/2018       Dear Dr. Humberto Carrillo:    Thank you for referring Valerie Rodriguez to me for evaluation. Attached you will find relevant portions of my assessment and plan of care.    If you have questions, please do not hesitate to call me. I look forward to following Valerie Rodriguez along with you.    Sincerely,    Alex Toth MD    Enclosure  CC:  No Recipients    If you would like to receive this communication electronically, please contact [email or web address] or [phone] to request more information on [employer] access.    For providers and/or their staff who would like to refer a patient to [facility], please contact us through our one-stop-shop provider referral line, [facility], at [phone].    If you feel you have received this communication in error or would no longer like to receive these types of communications, please e-mail [email or web address]

## 2018-06-28 NOTE — PROGRESS NOTES
History & Physical    SUBJECTIVE:     History of Present Illness:  Patient is a 37 y.o. female referred for abnormal mammogram of the right breast.  Patient reports pain breast however this is not new.  She denies any breast masses or nipple discharge.  She previously had a left breast biopsy years ago which was normal. Denies any family history of breast cancer.  Menarche age 8 and  1st at 18.  Partial hysterectomy at 35.  HRT use    Chief Complaint   Patient presents with    Consult       Review of patient's allergies indicates:   Allergen Reactions    Adhesive      Clear tape.  Paper Tape= OK.    Other      ALLERGY Pain patches IOther reaction(s): Unknown    Penicillins Itching    Dilaudid [hydromorphone (bulk)] Nausea Only    Latex, natural rubber Rash    Sulfur Rash       Current Outpatient Prescriptions   Medication Sig Dispense Refill    albuterol (VENTOLIN HFA) 90 mcg/actuation inhaler Inhale 2 puffs into the lungs every 6 (six) hours as needed for Wheezing. Rescue      estradiol (ESTRACE) 0.5 MG tablet Take 1 tablet (0.5 mg total) by mouth once daily. 30 tablet 11     No current facility-administered medications for this visit.        Past Medical History:   Diagnosis Date    Allergy     Glaucoma     Hyperlipidemia     Migraines     Mitral valve prolapse     Palpitations     Pre-op exam 2014    Swelling of face     after anesthesia    Transplant donor evaluation 2014     Past Surgical History:   Procedure Laterality Date    BREAST BIOPSY      BTL      BUNIONECTOMY      bilateral    ENDOMETRIAL ABLATION  2008    hemorroids      HERNIA REPAIR  2008    HYSTERECTOMY      INGUINAL HERNIA REPAIR  2008    @ same time as endometrial ablation    left foot      left foot x3 addition surgeries ; , (bunionectomy)    Left kidney donation  2014    TUBAL LIGATION  2008    UMBILICAL HERNIA REPAIR  May 2015     Family History   Problem Relation Age of Onset     "Hypertension Mother     Alcohol abuse Mother     Lupus Sister         ESRD s/p a kidney transplant    Hypertension Sister     Narcolepsy Brother     Breast cancer Neg Hx     Colon cancer Neg Hx     Ovarian cancer Neg Hx     Diabetes Neg Hx      Social History   Substance Use Topics    Smoking status: Never Smoker    Smokeless tobacco: Never Used    Alcohol use No      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         Review of Systems:  Review of Systems   Constitutional: Negative for activity change, appetite change, chills, diaphoresis, fatigue, fever and unexpected weight change.   HENT: Negative for congestion, dental problem, hearing loss, rhinorrhea, sore throat and trouble swallowing.    Eyes: Negative for discharge and visual disturbance.   Respiratory: Negative for cough, chest tightness, shortness of breath and wheezing.    Cardiovascular: Negative for chest pain, palpitations and leg swelling.   Gastrointestinal: Negative for abdominal distention, abdominal pain, blood in stool, constipation, diarrhea, nausea and vomiting.   Endocrine: Negative for cold intolerance, heat intolerance, polydipsia, polyphagia and polyuria.   Genitourinary: Negative for difficulty urinating, dysuria, frequency, hematuria, menstrual problem and urgency.   Musculoskeletal: Negative for arthralgias, gait problem, joint swelling, myalgias and neck pain.   Skin: Negative for color change, pallor, rash and wound.   Neurological: Positive for headaches. Negative for dizziness, syncope, weakness, light-headedness and numbness.   Hematological: Negative for adenopathy. Does not bruise/bleed easily.   Psychiatric/Behavioral: Negative for confusion, decreased concentration, dysphoric mood and sleep disturbance. The patient is not nervous/anxious.        OBJECTIVE:     Vital Signs (Most Recent)  Temp: 98.1 °F (36.7 °C) (06/27/18 1344)  Pulse: 70 (06/27/18 1344)  BP: 104/65 (06/27/18 1344)  5' 7.01" (1.702 m)  70.2 kg " (154 lb 12.2 oz)     Physical Exam:  Physical Exam   Constitutional: She is oriented to person, place, and time. She appears well-developed and well-nourished. No distress.   HENT:   Head: Normocephalic and atraumatic.   Right Ear: External ear normal.   Left Ear: External ear normal.   Eyes: Conjunctivae and EOM are normal. Pupils are equal, round, and reactive to light. No scleral icterus.   Neck: Normal range of motion. Neck supple. No tracheal deviation present. No thyromegaly present.   Cardiovascular: Normal rate, regular rhythm, normal heart sounds and intact distal pulses.  Exam reveals no gallop and no friction rub.    No murmur heard.  Pulmonary/Chest: Effort normal and breath sounds normal. No respiratory distress. She has no wheezes. She has no rales. She exhibits no tenderness. Right breast exhibits tenderness. Right breast exhibits no inverted nipple, no mass, no nipple discharge and no skin change. Left breast exhibits tenderness. Left breast exhibits no inverted nipple, no mass, no nipple discharge and no skin change.   Bilateral diffuse breast tenderness with palpation   Abdominal: Soft. Bowel sounds are normal. She exhibits no distension. There is no tenderness. No hernia.   Musculoskeletal: Normal range of motion. She exhibits no edema, tenderness or deformity.   Lymphadenopathy:     She has no cervical adenopathy.   Neurological: She is alert and oriented to person, place, and time.   Skin: Skin is warm and dry. No rash noted. She is not diaphoretic. No erythema. No pallor.   Psychiatric: She has a normal mood and affect. Her behavior is normal. Judgment and thought content normal.   Vitals reviewed.        Diagnostic Results:  Narrative & Impression     Result:   Mammo Digital Diagnostic Right with CAD     History:  Patient is 37 y.o. and is seen for a diagnostic mammogram.     Films Compared:  06/15/2018 Mammo Digital Screening Bilat with Tomosynthesis_CAD     Findings:   Computer-aided  detection was utilized in the interpretation of this examination.  The breast is extremely dense, which lowers the sensitivity of mammography.     There are coarse heterogeneous calcifications in a grouped distribution seen in the upper central region of the right breast in the middle depth.   These appear slightly pleomorphic on the ML view.     Impression:  Right  Calcifications: Right breast calcifications at the upper central middle position. Assessment: 4A - Suspicious finding. Stereotactic Biopsy is recommended.      BI-RADS Category:   Right: 4A - Low Suspicion for Malignancy  Overall: 4A - Low Suspicion for Malignancy     Recommendation:  Stereotactic Biopsy is recommended.     The patient's estimated lifetime risk of breast cancer (to age 85) based on Tyrer-Cuzick - 7 risk assessment model is: Tyrer-Cuzick: 12.12 %. According to the American Cancer Society,  patients with a lifetime breast cancer risk of 20% or higher might benefit from supplemental screening tests.             ASSESSMENT/PLAN:     37-year-old female with abnormal mammogram of the right breast (calcifications)    PLAN:Plan     Stereotactic core biopsy right breast  Call patient with results

## 2018-07-11 ENCOUNTER — HOSPITAL ENCOUNTER (OUTPATIENT)
Dept: RADIOLOGY | Facility: HOSPITAL | Age: 38
Discharge: HOME | End: 2018-07-11
Attending: SURGERY
Payer: MEDICAID

## 2018-07-11 DIAGNOSIS — R92.8 ABNORMAL MAMMOGRAM OF RIGHT BREAST: ICD-10-CM

## 2018-07-11 PROCEDURE — 19081 BX BREAST 1ST LESION STRTCTC: CPT | Mod: RT,,, | Performed by: RADIOLOGY

## 2018-07-11 PROCEDURE — 19081 BX BREAST 1ST LESION STRTCTC: CPT | Mod: TC,PO,RT

## 2018-07-11 PROCEDURE — 88305 TISSUE EXAM BY PATHOLOGIST: CPT | Performed by: PATHOLOGY

## 2018-07-11 PROCEDURE — 88305 TISSUE EXAM BY PATHOLOGIST: CPT | Mod: 26,,, | Performed by: PATHOLOGY

## 2018-07-11 NOTE — NURSING
Pressure held on right breast biopsy site for 10 mins, hemostasis was achieved, steri strips were applied, and wound was covered with 4x4 gauze and a tegaderm. Dressing clean, dry and intact with no drainage noted.  Discharge instructions given verbally and in writing, patient voiced understandings.  Patient discharged and accompanied by family member.

## 2018-07-18 ENCOUNTER — PATIENT MESSAGE (OUTPATIENT)
Dept: SURGERY | Facility: CLINIC | Age: 38
End: 2018-07-18

## 2018-07-18 DIAGNOSIS — R92.8 ABNORMAL MAMMOGRAM OF RIGHT BREAST: Primary | ICD-10-CM

## 2018-07-18 NOTE — TELEPHONE ENCOUNTER
Pathology discussed with patient  FINAL PATHOLOGIC DIAGNOSIS  Right breast, upper some central region middle depth (needle biopsy):  Benign breast with fibroadenomatoid changes and mild fibrocystic changes  Rare epithelial microcalcifications  Patchy mild periductal chronic inflammation  Also reviewed by 2nd pathologist who concurs    Six month interval mammogram and clinic appointment for breast exam

## 2019-01-16 ENCOUNTER — HOSPITAL ENCOUNTER (OUTPATIENT)
Dept: RADIOLOGY | Facility: HOSPITAL | Age: 39
Discharge: HOME | End: 2019-01-16
Attending: SURGERY
Payer: MEDICAID

## 2019-01-16 DIAGNOSIS — R92.8 ABNORMAL MAMMOGRAM OF RIGHT BREAST: ICD-10-CM

## 2019-01-16 PROCEDURE — 77065 MAMMO DIGITAL DIAGNOSTIC RIGHT WITH TOMOSYNTHESIS_CAD: ICD-10-PCS | Mod: 26,,, | Performed by: RADIOLOGY

## 2019-01-16 PROCEDURE — 77065 DX MAMMO INCL CAD UNI: CPT | Mod: 26,,, | Performed by: RADIOLOGY

## 2019-01-16 PROCEDURE — 77061 BREAST TOMOSYNTHESIS UNI: CPT | Mod: 26,,, | Performed by: RADIOLOGY

## 2019-01-16 PROCEDURE — 77065 DX MAMMO INCL CAD UNI: CPT | Mod: TC,PO

## 2019-01-16 PROCEDURE — 77061 BREAST TOMOSYNTHESIS UNI: CPT | Mod: TC,PO

## 2019-01-16 PROCEDURE — 77061 MAMMO DIGITAL DIAGNOSTIC RIGHT WITH TOMOSYNTHESIS_CAD: ICD-10-PCS | Mod: 26,,, | Performed by: RADIOLOGY

## 2019-01-17 ENCOUNTER — PATIENT MESSAGE (OUTPATIENT)
Dept: SURGERY | Facility: HOSPITAL | Age: 39
End: 2019-01-17

## 2019-01-21 ENCOUNTER — OFFICE VISIT (OUTPATIENT)
Dept: SURGERY | Facility: CLINIC | Age: 39
End: 2019-01-21
Payer: MEDICAID

## 2019-01-21 VITALS
HEIGHT: 67 IN | SYSTOLIC BLOOD PRESSURE: 110 MMHG | DIASTOLIC BLOOD PRESSURE: 62 MMHG | BODY MASS INDEX: 24.85 KG/M2 | TEMPERATURE: 99 F | WEIGHT: 158.31 LBS | HEART RATE: 81 BPM

## 2019-01-21 DIAGNOSIS — Z12.39 BREAST CANCER SCREENING: Primary | ICD-10-CM

## 2019-01-21 PROCEDURE — 99213 OFFICE O/P EST LOW 20 MIN: CPT | Mod: PBBFAC,PN | Performed by: SURGERY

## 2019-01-21 PROCEDURE — 99999 PR PBB SHADOW E&M-EST. PATIENT-LVL III: CPT | Mod: PBBFAC,,, | Performed by: SURGERY

## 2019-01-21 PROCEDURE — 99213 PR OFFICE/OUTPT VISIT, EST, LEVL III, 20-29 MIN: ICD-10-PCS | Mod: S$PBB,,, | Performed by: SURGERY

## 2019-01-21 PROCEDURE — 99999 PR PBB SHADOW E&M-EST. PATIENT-LVL III: ICD-10-PCS | Mod: PBBFAC,,, | Performed by: SURGERY

## 2019-01-21 PROCEDURE — 99213 OFFICE O/P EST LOW 20 MIN: CPT | Mod: S$PBB,,, | Performed by: SURGERY

## 2019-01-21 NOTE — PROGRESS NOTES
History & Physical    SUBJECTIVE:     History of Present Illness:  Patient is a 38 y.o. female presents for 6 month follow-up.  Her recent mammogram was benign BI-RADS 2, and she denies any new changes with her breast including masses, pain or discharge.    Initially referred for abnormal mammogram of the right breast.  Patient reports pain breast however this is not new.  She denies any breast masses or nipple discharge.  She previously had a left breast biopsy years ago which was normal. Denies any family history of breast cancer.  Menarche age 8 and  1st at 18.  Partial hysterectomy at 35.  HRT use    Chief Complaint   Patient presents with    Follow-up       Review of patient's allergies indicates:   Allergen Reactions    Adhesive      Clear tape.  Paper Tape= OK.    Other      ALLERGY Pain patches IOther reaction(s): Unknown    Penicillins Itching    Dilaudid [hydromorphone (bulk)] Nausea Only    Latex, natural rubber Rash    Sulfur Rash       Current Outpatient Medications   Medication Sig Dispense Refill    albuterol (VENTOLIN HFA) 90 mcg/actuation inhaler Inhale 2 puffs into the lungs every 6 (six) hours as needed for Wheezing. Rescue      estradiol (ESTRACE) 0.5 MG tablet Take 1 tablet (0.5 mg total) by mouth once daily. 30 tablet 11     No current facility-administered medications for this visit.        Past Medical History:   Diagnosis Date    Allergy     Glaucoma     Hyperlipidemia     Migraines     Mitral valve prolapse     Palpitations     Pre-op exam 2014    Swelling of face     after anesthesia    Transplant donor evaluation 2014     Past Surgical History:   Procedure Laterality Date    BREAST BIOPSY      BTL      BUNIONECTOMY      bilateral    ENDOMETRIAL ABLATION  2008    EXCISION, MILLER'S NEUROMA Left 2013    [facility]oval D[facility]    hemorroids      HERNIA REPAIR      HYSTERECTOMY      HYSTERECTOMY-TOTAL LAPAROSCOPIC  ([facility]) N/A 7/19/2016    [facility] at [facility]    HYSTEROSCOPY, WITH DILATION AND CURETTAGE OF UTERUS AND HYDROTHERMAL ENDOMETRIAL ABLATION N/A 10/28/2013    [facility] at [facility]    INGUINAL HERNIA REPAIR  2008    @ same time as endometrial ablation    left foot      left foot x3 addition surgeries 2013; 2011, 2009(bunionectomy)    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    LIGATION, FALLOPIAN TUBE, LAPAROSCOPIC Bilateral 10/28/2013    [facility] at [facility]    REPAIR-HERNIA-INGUINAL-LAPAROSCOPIC Right 10/28/2013    [facility] Blas Arnold [facility] at [facility]    REPAIR-HERNIA-LAPAROSCOPIC-VENTRAL N/A 4/29/2015    [facility] at [facility]    ROBOTIC ASSISTED LAPAROSCOPIC NEPHRECTOMY-DONOR Left 12/2/2014    Performed by Edvin Maravilla MD at [facility] 2ND [facility]    SALPINGECTOMY-LAPAROSCOPIC  7/19/2016    [facility]    TUBAL LIGATION  2008    UMBILICAL HERNIA REPAIR  May 2015     Family History   Problem Relation Age of Onset    Hypertension Mother     Alcohol abuse Mother     Lupus Sister         ESRD s/p a kidney transplant    Hypertension Sister     Narcolepsy Brother     Breast cancer Neg Hx     Colon cancer Neg Hx     Ovarian cancer Neg Hx     Diabetes Neg Hx      Social History     Tobacco Use    Smoking status: Never Smoker    Smokeless tobacco: Never Used   Substance Use Topics    Alcohol use: No     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     Drug use: No        Review of Systems:  Review of Systems   Constitutional: Negative for activity change, appetite change, chills, diaphoresis, fatigue, fever and unexpected weight change.   HENT: Negative for congestion, dental problem, hearing loss, rhinorrhea, sore throat and trouble swallowing.    Eyes: Negative for discharge and visual disturbance.   Respiratory: Negative for cough, chest tightness, shortness of breath and  "wheezing.    Cardiovascular: Negative for chest pain, palpitations and leg swelling.   Gastrointestinal: Negative for abdominal distention, abdominal pain, blood in stool, constipation, diarrhea, nausea and vomiting.   Endocrine: Negative for cold intolerance, heat intolerance, polydipsia, polyphagia and polyuria.   Genitourinary: Negative for difficulty urinating, dysuria, frequency, hematuria, menstrual problem and urgency.   Musculoskeletal: Negative for arthralgias, gait problem, joint swelling, myalgias and neck pain.   Skin: Negative for color change, pallor, rash and wound.   Neurological: Positive for headaches. Negative for dizziness, syncope, weakness, light-headedness and numbness.   Hematological: Negative for adenopathy. Does not bruise/bleed easily.   Psychiatric/Behavioral: Negative for confusion, decreased concentration, dysphoric mood and sleep disturbance. The patient is not nervous/anxious.        OBJECTIVE:     Vital Signs (Most Recent)  Temp: 99.2 °F (37.3 °C) (01/21/19 1022)  Pulse: 81 (01/21/19 1022)  BP: 110/62 (01/21/19 1022)  5' 7.01" (1.702 m)  71.8 kg (158 lb 4.6 oz)     Physical Exam:  Physical Exam   Constitutional: She is oriented to person, place, and time. She appears well-developed and well-nourished. No distress.   HENT:   Head: Normocephalic and atraumatic.   Right Ear: External ear normal.   Left Ear: External ear normal.   Eyes: Conjunctivae and EOM are normal. Pupils are equal, round, and reactive to light. No scleral icterus.   Neck: Normal range of motion. Neck supple. No tracheal deviation present. No thyromegaly present.   Cardiovascular: Normal rate, regular rhythm, normal heart sounds and intact distal pulses. Exam reveals no gallop and no friction rub.   No murmur heard.  Pulmonary/Chest: Effort normal and breath sounds normal. No respiratory distress. She has no wheezes. She has no rales. She exhibits no tenderness. Right breast exhibits tenderness. Right breast " exhibits no inverted nipple, no mass, no nipple discharge and no skin change. Left breast exhibits tenderness. Left breast exhibits no inverted nipple, no mass, no nipple discharge and no skin change.   Bilateral diffuse breast tenderness with palpation   Abdominal: Soft. Bowel sounds are normal. She exhibits no distension. There is no tenderness. No hernia.   Musculoskeletal: Normal range of motion. She exhibits no edema, tenderness or deformity.   Lymphadenopathy:     She has no cervical adenopathy.   Neurological: She is alert and oriented to person, place, and time.   Skin: Skin is warm and dry. No rash noted. She is not diaphoretic. No erythema. No pallor.   Psychiatric: She has a normal mood and affect. Her behavior is normal. Judgment and thought content normal.   Vitals reviewed.        Diagnostic Results:  Result:   Mammo Digital Diagnostic Right w/ [place]     History:  Patient is 38 y.o. and is seen for a diagnostic mammogram.     Films Compared:  Compared to: 07/11/2018 Mammo Breast Stereotactic Biopsy Right and 06/19/2018 Mammo Digital Diagnostic Right with CAD     Findings:  This procedure was performed using tomosynthesis.  Computer-aided detection was utilized in the interpretation of this examination.  Right  The breast is extremely dense, which lowers the sensitivity of mammography.     There is a biopsy clip seen in the right breast at 12 o'clock in the middle depth.      Impression:  There is no mammographic evidence of malignancy.     BI-RADS Category:   Right: 2 - Benign  Overall: 2 - Benign     Recommendation:  Annual mammogram is recommended beginning at age 40.     The patient's estimated lifetime risk of breast cancer (to age 85) based on Tyrer-Cuzick - 7 risk assessment model is: Tyrer-Cuzick: 12.12 %. According to the American Cancer Society,  patients with a lifetime breast cancer risk of 20% or higher might benefit from supplemental screening tests.    FINAL PATHOLOGIC DIAGNOSIS  Right  breast, upper some central region middle depth (needle biopsy):  Benign breast with fibroadenomatoid changes and mild fibrocystic changes  Rare epithelial microcalcifications  Patchy mild periductal chronic inflammation    ASSESSMENT/PLAN:     37-year-old female normal breast exam, mammogram BI-RADS 2 benign    PLAN:Plan     Mammogram reviewed, normal CBE  Follow up with OB or PCP for future mammograms at age-appropriate screening

## 2019-01-29 ENCOUNTER — LAB VISIT (OUTPATIENT)
Dept: LAB | Facility: HOSPITAL | Age: 39
End: 2019-01-29
Attending: FAMILY MEDICINE
Payer: MEDICAID

## 2019-01-29 ENCOUNTER — OFFICE VISIT (OUTPATIENT)
Dept: INTERNAL MEDICINE | Facility: CLINIC | Age: 39
End: 2019-01-29
Payer: MEDICAID

## 2019-01-29 VITALS
HEART RATE: 75 BPM | RESPIRATION RATE: 18 BRPM | SYSTOLIC BLOOD PRESSURE: 110 MMHG | HEIGHT: 67 IN | TEMPERATURE: 99 F | BODY MASS INDEX: 24.67 KG/M2 | OXYGEN SATURATION: 99 % | WEIGHT: 157.19 LBS | DIASTOLIC BLOOD PRESSURE: 68 MMHG

## 2019-01-29 DIAGNOSIS — Z52.4 DONOR OF KIDNEY FOR TRANSPLANT: ICD-10-CM

## 2019-01-29 DIAGNOSIS — N76.1 CHRONIC VAGINITIS: ICD-10-CM

## 2019-01-29 DIAGNOSIS — N39.46 MIXED INCONTINENCE URGE AND STRESS: ICD-10-CM

## 2019-01-29 DIAGNOSIS — Z90.710 STATUS POST HYSTERECTOMY: ICD-10-CM

## 2019-01-29 DIAGNOSIS — R30.0 DYSURIA: ICD-10-CM

## 2019-01-29 DIAGNOSIS — Z52.4 DONOR OF KIDNEY FOR TRANSPLANT: Primary | ICD-10-CM

## 2019-01-29 DIAGNOSIS — Z20.2 POSSIBLE EXPOSURE TO STD: ICD-10-CM

## 2019-01-29 LAB
ALBUMIN SERPL BCP-MCNC: 3.8 G/DL
ALP SERPL-CCNC: 55 U/L
ALT SERPL W/O P-5'-P-CCNC: 15 U/L
ANION GAP SERPL CALC-SCNC: 8 MMOL/L
AST SERPL-CCNC: 18 U/L
BASOPHILS # BLD AUTO: 0.05 K/UL
BASOPHILS NFR BLD: 0.9 %
BILIRUB SERPL-MCNC: 0.4 MG/DL
BUN SERPL-MCNC: 10 MG/DL
CALCIUM SERPL-MCNC: 9.3 MG/DL
CHLORIDE SERPL-SCNC: 106 MMOL/L
CO2 SERPL-SCNC: 27 MMOL/L
CREAT SERPL-MCNC: 1 MG/DL
DIFFERENTIAL METHOD: ABNORMAL
EOSINOPHIL # BLD AUTO: 0.1 K/UL
EOSINOPHIL NFR BLD: 2.2 %
ERYTHROCYTE [DISTWIDTH] IN BLOOD BY AUTOMATED COUNT: 13.3 %
EST. GFR  (AFRICAN AMERICAN): >60 ML/MIN/1.73 M^2
EST. GFR  (NON AFRICAN AMERICAN): >60 ML/MIN/1.73 M^2
GLUCOSE SERPL-MCNC: 66 MG/DL
HCT VFR BLD AUTO: 42.4 %
HGB BLD-MCNC: 13 G/DL
IMM GRANULOCYTES # BLD AUTO: 0 K/UL
IMM GRANULOCYTES NFR BLD AUTO: 0 %
LYMPHOCYTES # BLD AUTO: 2.6 K/UL
LYMPHOCYTES NFR BLD: 49 %
MCH RBC QN AUTO: 26.3 PG
MCHC RBC AUTO-ENTMCNC: 30.7 G/DL
MCV RBC AUTO: 86 FL
MONOCYTES # BLD AUTO: 0.5 K/UL
MONOCYTES NFR BLD: 9.3 %
NEUTROPHILS # BLD AUTO: 2.1 K/UL
NEUTROPHILS NFR BLD: 38.6 %
NRBC BLD-RTO: 0 /100 WBC
PLATELET # BLD AUTO: 294 K/UL
PMV BLD AUTO: 8.6 FL
POTASSIUM SERPL-SCNC: 4 MMOL/L
PROT SERPL-MCNC: 7.5 G/DL
RBC # BLD AUTO: 4.94 M/UL
SODIUM SERPL-SCNC: 141 MMOL/L
TSH SERPL DL<=0.005 MIU/L-ACNC: 1.76 UIU/ML
WBC # BLD AUTO: 5.37 K/UL

## 2019-01-29 PROCEDURE — 99214 OFFICE O/P EST MOD 30 MIN: CPT | Mod: PBBFAC | Performed by: FAMILY MEDICINE

## 2019-01-29 PROCEDURE — 99999 PR PBB SHADOW E&M-EST. PATIENT-LVL IV: CPT | Mod: PBBFAC,,, | Performed by: FAMILY MEDICINE

## 2019-01-29 PROCEDURE — 80053 COMPREHEN METABOLIC PANEL: CPT

## 2019-01-29 PROCEDURE — 36415 COLL VENOUS BLD VENIPUNCTURE: CPT

## 2019-01-29 PROCEDURE — 99213 OFFICE O/P EST LOW 20 MIN: CPT | Mod: S$PBB,,, | Performed by: FAMILY MEDICINE

## 2019-01-29 PROCEDURE — 86592 SYPHILIS TEST NON-TREP QUAL: CPT

## 2019-01-29 PROCEDURE — 86803 HEPATITIS C AB TEST: CPT

## 2019-01-29 PROCEDURE — 86703 HIV-1/HIV-2 1 RESULT ANTBDY: CPT

## 2019-01-29 PROCEDURE — 86696 HERPES SIMPLEX TYPE 2 TEST: CPT

## 2019-01-29 PROCEDURE — 86705 HEP B CORE ANTIBODY IGM: CPT

## 2019-01-29 PROCEDURE — 84443 ASSAY THYROID STIM HORMONE: CPT

## 2019-01-29 PROCEDURE — 99213 PR OFFICE/OUTPT VISIT, EST, LEVL III, 20-29 MIN: ICD-10-PCS | Mod: S$PBB,,, | Performed by: FAMILY MEDICINE

## 2019-01-29 PROCEDURE — 99999 PR PBB SHADOW E&M-EST. PATIENT-LVL IV: ICD-10-PCS | Mod: PBBFAC,,, | Performed by: FAMILY MEDICINE

## 2019-01-29 PROCEDURE — 87491 CHLMYD TRACH DNA AMP PROBE: CPT

## 2019-01-29 PROCEDURE — 87340 HEPATITIS B SURFACE AG IA: CPT

## 2019-01-29 PROCEDURE — 85025 COMPLETE CBC W/AUTO DIFF WBC: CPT

## 2019-01-29 NOTE — PROGRESS NOTES
Valerie Rodriguez  01/29/2019  [id number]    Colin Martinez MD  Patient Care Team:  Colin Martinez MD as PCP - General (Family Medicine)  Primary Doctor No        Chief Complaint:  Chief Complaint   Patient presents with    Urinary Tract Infection       History of Present Illness:  This patient is trying to establish with a new set of doctors closer to where she lives now.  She states that she is in a fairly new relationship and and although she has never had any STDs previously she wants to be thoroughly tested.  She does have an extensive history of chronic vaginitis and UTIs and has 1 remaining kidney on the right after she had donated her left kidney a few years ago.  Patient has some ongoing dysuria and states that she has not had lab tests done over a year either so she would like to get some basic labs done as well.  She would like to get referred to Urology and Gynecology in the [facility].        History:  Past Medical History:   Diagnosis Date    Allergy     Glaucoma     Hyperlipidemia     Migraines     Mitral valve prolapse     Palpitations     Pre-op exam 11/24/2014    Swelling of face     after anesthesia    Transplant donor evaluation 9/25/2014     Past Surgical History:   Procedure Laterality Date    BREAST BIOPSY      BTL      BUNIONECTOMY      bilateral    ENDOMETRIAL ABLATION  2008    EXCISION, MILLER'S NEUROMA Left 4/24/2013    [facility]    hemorroids      HERNIA REPAIR  2008    HYSTERECTOMY      HYSTERECTOMY-TOTAL LAPAROSCOPIC ([facility]) N/A 7/19/2016    [facility]    HYSTEROSCOPY, WITH DILATION AND CURETTAGE OF UTERUS AND HYDROTHERMAL ENDOMETRIAL ABLATION N/A 10/28/2013    [facility] at [facility] OR    INGUINAL HERNIA REPAIR  2008    @ same time as endometrial ablation    left foot      left foot x3 addition surgeries 2013; 2011, 2009(bunionectomy)    Left kidney donation   12/02/2014    LIGATION, FALLOPIAN TUBE, LAPAROSCOPIC Bilateral 10/28/2013    [facility] OR    REPAIR-HERNIA-INGUINAL-LAPAROSCOPIC Right 10/28/2013    Performed by Blas Arnold [facility] at [facility] OR    REPAIR-HERNIA-LAPAROSCOPIC-VENTRAL N/A 4/29/2015    [facility] by [facility] at [facility] OR    ROBOTIC ASSISTED LAPAROSCOPIC NEPHRECTOMY-DONOR Left 12/2/2014    Performed by [facility] 2ND FLR    SALPINGECTOMY-LAPAROSCOPIC  7/19/2016    [facility] at [facility] OR    TUBAL LIGATION  2008    UMBILICAL HERNIA REPAIR  May 2015     Family History   Problem Relation Age of Onset    Hypertension Mother     Alcohol abuse Mother     Lupus Sister         ESRD s/p a kidney transplant    Hypertension Sister     Narcolepsy Brother     Breast cancer Neg Hx     Colon cancer Neg Hx     Ovarian cancer Neg Hx     Diabetes Neg Hx      Social History     Socioeconomic History    Marital status:      Spouse name: Not on file    Number of children: 5    Years of education: Not on file    Highest education level: Not on file   Social Needs    Financial resource strain: Not on file    Food insecurity - worry: Not on file    Food insecurity - inability: Not on file    Transportation needs - medical: Not on file    Transportation needs - non-medical: Not on file   Occupational History    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     Drug use: No    Sexual activity: Yes     Partners: Male     Birth control/protection: Surgical   Other Topics Concern    Not on file   Social History Narrative    Recently  for about a year    Wants to try again for another baby     Patient Active Problem List   Diagnosis    Palpitations     Mitral valve prolapse    Neuralgic migraines    Hyperlipidemia    Varicose veins    Donor of kidney for transplant-12/2/14    Abdominal pain    Ventral hernia, unspecified, without mention of obstruction or gangrene    Knee pain, left    Quadriceps weakness    Decreased functional mobility    Mixed incontinence urge and stress    Dysuria    Hematuria, gross    Status post hysterectomy    Menopausal state    Chronic vaginitis    Breast pain     Review of patient's allergies indicates:   Allergen Reactions    Adhesive      Clear tape.  Paper Tape= OK.    Other      ALLERGY Pain patches IOther reaction(s): Unknown    Penicillins Itching    Dilaudid [hydromorphone (bulk)] Nausea Only    Latex, natural rubber Rash    Sulfur Rash       The following were reviewed at this visit: active problem list, medication list, allergies, family history, social history, and health maintenance.    Medications:  Current Outpatient Medications on File Prior to Visit   Medication Sig Dispense Refill    albuterol (VENTOLIN HFA) 90 mcg/actuation inhaler Inhale 2 puffs into the lungs every 6 (six) hours as needed for Wheezing. Rescue      estradiol (ESTRACE) 0.5 MG tablet Take 1 tablet (0.5 mg total) by mouth once daily. 30 tablet 11     No current facility-administered medications on file prior to visit.        Medications have been reviewed and reconciled with patient at this visit.      Exam:  Wt Readings from Last 3 Encounters:   01/29/19 71.3 kg (157 lb 3 oz)   01/21/19 71.8 kg (158 lb 4.6 oz)   06/27/18 70.2 kg (154 lb 12.2 oz)     Temp Readings from Last 3 Encounters:   01/29/19 98.5 °F (36.9 °C) (Tympanic)   01/21/19 99.2 °F (37.3 °C) (Oral)   06/27/18 98.1 °F (36.7 °C) (Oral)     BP Readings from Last 3 Encounters:   01/29/19 110/68   01/21/19 110/62   06/27/18 104/65     Pulse Readings from Last 3 Encounters:   01/29/19 75   01/21/19 81   06/27/18 70     Body mass index is 24.99 kg/m².      ROS- recent  weight loss generalized weakness night sweats or fevers  HEENT no nasal discharge tinnitus or severe headaches  Cardio-no recent chest pain or palpitations  Pulmonary no recent shortness of breath wheezes or pleuritic chest pain  GI-no nausea vomiting diarrhea    Physical Exam -patient is well-nourished well-developed, alert and oriented, ambulatory and in no acute distress  Abdomen  soft nontender will, no CVA tenderness  Extremities no edema or discoloration  Neuro grossly intact  Psychiatric good affect and cognition        Valerie was seen today for urinary tract infection.    Diagnoses and all orders for this visit:    Donor of kidney for transplant-12/2/14  -     CBC auto differential; Future  -     Ambulatory Referral to Urology    Mixed incontinence urge and stress  -     TSH; Future  -     Ambulatory Referral to Urology    Dysuria  -     Urinalysis; Future  -     Urine culture; Future    Status post hysterectomy    Chronic vaginitis  -     Comprehensive metabolic panel; Future  -     Ambulatory referral to Gynecology    Possible exposure to STD  -     HIV 1/2 Ag/Ab (4th Gen); Future  -     RPR; Future  -     C. trachomatis/N. gonorrhoeae by AMP DNA  -     Hepatitis C antibody; Future  -     Hepatitis B surface antigen; Future  -     Hepatitis B core antibody, IgM; Future  -     Herpes Simplex Virus (HSV) Types 1 & 2, IgG, Herpes Titer; Future  -     Ambulatory referral to Gynecology                  Follow up: Follow-up if symptoms worsen or fail to improve.    After visit summary was printed and given to patient upon discharge today.  Patient goals and care plan are included in After Visit Summary.

## 2019-01-30 LAB
HBV CORE IGM SERPL QL IA: NEGATIVE
HBV SURFACE AG SERPL QL IA: NEGATIVE
HCV AB SERPL QL IA: NEGATIVE
HIV 1+2 AB+HIV1 P24 AG SERPL QL IA: NEGATIVE
RPR SER QL: NORMAL

## 2019-01-31 LAB
C TRACH DNA SPEC QL NAA+PROBE: NOT DETECTED
N GONORRHOEA DNA SPEC QL NAA+PROBE: NOT DETECTED

## 2019-02-01 LAB
HSV1 IGG SERPL QL IA: POSITIVE
HSV2 IGG SERPL QL IA: POSITIVE

## 2019-02-01 RX ORDER — CIPROFLOXACIN 500 MG/1
500 TABLET ORAL 2 TIMES DAILY
Qty: 14 TABLET | Refills: 0 | Status: SHIPPED | OUTPATIENT
Start: 2019-02-01 | End: 2019-02-12

## 2019-02-04 ENCOUNTER — TELEPHONE (OUTPATIENT)
Dept: INTERNAL MEDICINE | Facility: CLINIC | Age: 39
End: 2019-02-04

## 2019-02-04 NOTE — TELEPHONE ENCOUNTER
----- Message from Nathaniel Boyd MD sent at 2/1/2019 12:04 PM CST -----  Urinalysis shows she may have mild urinary tract bladder infection.  If she has any burning stinging or urinary symptoms at all it would be good to take the Cipro antibiotic which I will call in to the pharmacy for her.  She is to follow up with Urology appointments in the near future.

## 2019-02-12 ENCOUNTER — HOSPITAL ENCOUNTER (OUTPATIENT)
Dept: RADIOLOGY | Facility: HOSPITAL | Age: 39
Discharge: HOME | End: 2019-02-12
Attending: FAMILY MEDICINE
Payer: MEDICAID

## 2019-02-12 ENCOUNTER — OFFICE VISIT (OUTPATIENT)
Dept: INTERNAL MEDICINE | Facility: CLINIC | Age: 39
End: 2019-02-12
Payer: MEDICAID

## 2019-02-12 VITALS
OXYGEN SATURATION: 100 % | WEIGHT: 156.06 LBS | DIASTOLIC BLOOD PRESSURE: 66 MMHG | TEMPERATURE: 98 F | BODY MASS INDEX: 24.49 KG/M2 | HEIGHT: 67 IN | HEART RATE: 60 BPM | SYSTOLIC BLOOD PRESSURE: 102 MMHG | RESPIRATION RATE: 18 BRPM

## 2019-02-12 DIAGNOSIS — J40 BRONCHITIS: Primary | ICD-10-CM

## 2019-02-12 DIAGNOSIS — I34.1 MITRAL VALVE PROLAPSE: ICD-10-CM

## 2019-02-12 DIAGNOSIS — N76.1 CHRONIC VAGINITIS: ICD-10-CM

## 2019-02-12 DIAGNOSIS — J40 BRONCHITIS: ICD-10-CM

## 2019-02-12 LAB
INFLUENZA A, MOLECULAR: NEGATIVE
INFLUENZA B, MOLECULAR: NEGATIVE
SPECIMEN SOURCE: NORMAL

## 2019-02-12 PROCEDURE — 99214 OFFICE O/P EST MOD 30 MIN: CPT | Mod: PBBFAC,25 | Performed by: FAMILY MEDICINE

## 2019-02-12 PROCEDURE — 99214 PR OFFICE/OUTPT VISIT, EST, LEVL IV, 30-39 MIN: ICD-10-PCS | Mod: S$PBB,,, | Performed by: FAMILY MEDICINE

## 2019-02-12 PROCEDURE — 99999 PR PBB SHADOW E&M-EST. PATIENT-LVL IV: ICD-10-PCS | Mod: PBBFAC,,, | Performed by: FAMILY MEDICINE

## 2019-02-12 PROCEDURE — 71046 X-RAY EXAM CHEST 2 VIEWS: CPT | Mod: 26,,, | Performed by: RADIOLOGY

## 2019-02-12 PROCEDURE — 71046 X-RAY EXAM CHEST 2 VIEWS: CPT | Mod: TC

## 2019-02-12 PROCEDURE — 99214 OFFICE O/P EST MOD 30 MIN: CPT | Mod: S$PBB,,, | Performed by: FAMILY MEDICINE

## 2019-02-12 PROCEDURE — 71046 XR CHEST PA AND LATERAL: ICD-10-PCS | Mod: 26,,, | Performed by: RADIOLOGY

## 2019-02-12 PROCEDURE — 87502 INFLUENZA DNA AMP PROBE: CPT

## 2019-02-12 PROCEDURE — 99999 PR PBB SHADOW E&M-EST. PATIENT-LVL IV: CPT | Mod: PBBFAC,,, | Performed by: FAMILY MEDICINE

## 2019-02-12 RX ORDER — ALBUTEROL SULFATE 0.83 MG/ML
SOLUTION RESPIRATORY (INHALATION)
COMMUNITY
Start: 2019-02-06 | End: 2020-09-21 | Stop reason: SDUPTHER

## 2019-02-14 RX ORDER — BENZONATATE 200 MG/1
200 CAPSULE ORAL 3 TIMES DAILY PRN
Qty: 30 CAPSULE | Refills: 1 | Status: SHIPPED | OUTPATIENT
Start: 2019-02-14 | End: 2019-02-24

## 2019-02-14 RX ORDER — FLUCONAZOLE 150 MG/1
TABLET ORAL
Qty: 3 TABLET | Refills: 1 | Status: SHIPPED | OUTPATIENT
Start: 2019-02-14 | End: 2019-08-29 | Stop reason: ALTCHOICE

## 2019-02-19 ENCOUNTER — LAB VISIT (OUTPATIENT)
Dept: LAB | Facility: HOSPITAL | Age: 39
End: 2019-02-19
Attending: FAMILY MEDICINE
Payer: MEDICAID

## 2019-02-19 ENCOUNTER — OFFICE VISIT (OUTPATIENT)
Dept: INTERNAL MEDICINE | Facility: CLINIC | Age: 39
End: 2019-02-19
Payer: MEDICAID

## 2019-02-19 VITALS
RESPIRATION RATE: 18 BRPM | SYSTOLIC BLOOD PRESSURE: 104 MMHG | WEIGHT: 163.38 LBS | DIASTOLIC BLOOD PRESSURE: 68 MMHG | TEMPERATURE: 102 F | BODY MASS INDEX: 25.64 KG/M2 | OXYGEN SATURATION: 99 % | HEIGHT: 67 IN | HEART RATE: 120 BPM

## 2019-02-19 DIAGNOSIS — M79.10 MYALGIA: Primary | ICD-10-CM

## 2019-02-19 DIAGNOSIS — M79.10 MYALGIA: ICD-10-CM

## 2019-02-19 DIAGNOSIS — R05.9 COUGH: ICD-10-CM

## 2019-02-19 LAB
BASOPHILS # BLD AUTO: 0.03 K/UL
BASOPHILS NFR BLD: 0.5 %
DIFFERENTIAL METHOD: ABNORMAL
EOSINOPHIL # BLD AUTO: 0.1 K/UL
EOSINOPHIL NFR BLD: 1.4 %
ERYTHROCYTE [DISTWIDTH] IN BLOOD BY AUTOMATED COUNT: 13.5 %
HCT VFR BLD AUTO: 38.3 %
HGB BLD-MCNC: 11.8 G/DL
IMM GRANULOCYTES # BLD AUTO: 0.02 K/UL
IMM GRANULOCYTES NFR BLD AUTO: 0.3 %
INFLUENZA A, MOLECULAR: POSITIVE
INFLUENZA B, MOLECULAR: NEGATIVE
LYMPHOCYTES # BLD AUTO: 1.2 K/UL
LYMPHOCYTES NFR BLD: 18.6 %
MCH RBC QN AUTO: 26.5 PG
MCHC RBC AUTO-ENTMCNC: 30.8 G/DL
MCV RBC AUTO: 86 FL
MONOCYTES # BLD AUTO: 0.6 K/UL
MONOCYTES NFR BLD: 9.9 %
NEUTROPHILS # BLD AUTO: 4.4 K/UL
NEUTROPHILS NFR BLD: 69.3 %
NRBC BLD-RTO: 0 /100 WBC
PLATELET # BLD AUTO: 297 K/UL
PMV BLD AUTO: 8.7 FL
RBC # BLD AUTO: 4.45 M/UL
SPECIMEN SOURCE: ABNORMAL
WBC # BLD AUTO: 6.35 K/UL

## 2019-02-19 PROCEDURE — 99999 PR PBB SHADOW E&M-EST. PATIENT-LVL IV: CPT | Mod: PBBFAC,,, | Performed by: FAMILY MEDICINE

## 2019-02-19 PROCEDURE — 99213 PR OFFICE/OUTPT VISIT, EST, LEVL III, 20-29 MIN: ICD-10-PCS | Mod: S$PBB,,, | Performed by: FAMILY MEDICINE

## 2019-02-19 PROCEDURE — 99999 PR PBB SHADOW E&M-EST. PATIENT-LVL IV: ICD-10-PCS | Mod: PBBFAC,,, | Performed by: FAMILY MEDICINE

## 2019-02-19 PROCEDURE — 36415 COLL VENOUS BLD VENIPUNCTURE: CPT

## 2019-02-19 PROCEDURE — 85025 COMPLETE CBC W/AUTO DIFF WBC: CPT

## 2019-02-19 PROCEDURE — 87502 INFLUENZA DNA AMP PROBE: CPT

## 2019-02-19 PROCEDURE — 99213 OFFICE O/P EST LOW 20 MIN: CPT | Mod: S$PBB,,, | Performed by: FAMILY MEDICINE

## 2019-02-19 PROCEDURE — 99214 OFFICE O/P EST MOD 30 MIN: CPT | Mod: PBBFAC | Performed by: FAMILY MEDICINE

## 2019-02-19 RX ORDER — OSELTAMIVIR PHOSPHATE 75 MG/1
75 CAPSULE ORAL 2 TIMES DAILY
Qty: 10 CAPSULE | Refills: 0 | Status: SHIPPED | OUTPATIENT
Start: 2019-02-19 | End: 2019-02-24

## 2019-02-19 NOTE — PROGRESS NOTES
Valerie Rodriguez  02/19/2019  [id number]    Colin Martinez MD  Patient Care Team:  Colin Martinez MD as PCP - General (Family Medicine)  Primary Doctor No        Chief Complaint:  Chief Complaint   Patient presents with    URI       History of Present Illness:  Patient states that she has had several days of severe cough scratchy throat nasal congestion and with fever today.  She does have myalgias and has been in close contact with several people over the last few days to her known to have the flu.  She also had chills over night.      History:  Past Medical History:   Diagnosis Date    Allergy     Glaucoma     Hyperlipidemia     Migraines     Mitral valve prolapse     Palpitations     Pre-op exam 11/24/2014    Swelling of face     after anesthesia    Transplant donor evaluation 9/25/2014     Past Surgical History:   Procedure Laterality Date    BREAST BIOPSY      BTL      BUNIONECTOMY      bilateral    ENDOMETRIAL ABLATION  2008    EXCISION, MILLER'S NEUROMA Left 4/24/2013    [facility] OR    hemorroids      HERNIA REPAIR  2008    HYSTERECTOMY      HYSTERECTOMY-TOTAL LAPAROSCOPIC ([facility]) N/A 7/19/2016    [facility] at [facility] OR    HYSTEROSCOPY, WITH DILATION AND CURETTAGE OF UTERUS AND HYDROTHERMAL ENDOMETRIAL ABLATION N/A 10/28/2013    Performed by [facility]to [facility]illo [facility] at [facility] OR    INGUINAL HERNIA REPAIR  2008    @ same time as endometrial ablation    left foot      left foot x3 addition surgeries 2013; 2011, 2009(bunionectomy)    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    LIGATION, FALLOPIAN TUBE, LAPAROSCOPIC Bilateral 10/28/2013    [facility]formed by [facility] at [facility] OR    REPAIR-HERNIA-INGUINAL-LAPAROSCOPIC Right 10/28/2013    Performed by Blas Arnold MD at [facility] OR    REPAIR-HERNIA-LAPAROSCOPIC-VENTRAL N/A 4/29/2015    Performed by Blas Arnold MD at [facility] OR    ROBOTIC ASSISTED LAPAROSCOPIC NEPHRECTOMY-DONOR Left  12/2/2014    [facility] by [facility] OR 2ND FLR    SALPINGECTOMY-LAPAROSCOPIC  7/19/2016    [facility] OR    TUBAL LIGATION  2008    UMBILICAL HERNIA REPAIR  May 2015     Family History   Problem Relation Age of Onset    Hypertension Mother     Alcohol abuse Mother     Lupus Sister         ESRD s/p a kidney transplant    Hypertension Sister     Narcolepsy Brother     Breast cancer Neg Hx     Colon cancer Neg Hx     Ovarian cancer Neg Hx     Diabetes Neg Hx      Social History     Socioeconomic History    Marital status:      Spouse name: Not on file    Number of children: 5    Years of education: Not on file    Highest education level: Not on file   Social Needs    Financial resource strain: Not on file    Food insecurity - worry: Not on file    Food insecurity - inability: Not on file    Transportation needs - medical: Not on file    Transportation needs - non-medical: Not on file   Occupational History    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      Employer: [employer]   Tobacco Use    Smoking status: Never Smoker    Smokeless tobacco: Never Used   Substance and Sexual Activity    Alcohol use: No     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     Drug use: No    Sexual activity: Yes     Partners: Male     Birth control/protection: Surgical   Other Topics Concern    Not on file   Social History Narrative    Recently  for about a year    Wants to try again for another baby     Patient Active Problem List   Diagnosis    Palpitations    Mitral valve prolapse    Neuralgic migraines    Hyperlipidemia    Varicose veins    Donor of kidney for transplant-12/2/14    Abdominal pain    Ventral hernia, unspecified, without mention of obstruction or gangrene    Knee pain, left    Quadriceps weakness    Decreased functional mobility    Mixed incontinence urge and stress    Dysuria    Hematuria, gross     Status post hysterectomy    Menopausal state    Chronic vaginitis    Breast pain     Review of patient's allergies indicates:   Allergen Reactions    Adhesive      Clear tape.  Paper Tape= OK.    Other      ALLERGY Pain patches IOther reaction(s): Unknown    Penicillins Itching    Dilaudid [hydromorphone (bulk)] Nausea Only    Latex, natural rubber Rash    Sulfur Rash       The following were reviewed at this visit: active problem list, medication list, allergies, family history, social history, and health maintenance.    Medications:  Current Outpatient Medications on File Prior to Visit   Medication Sig Dispense Refill    albuterol (PROVENTIL) 2.5 mg /3 mL (0.083 %) nebulizer solution       albuterol (VENTOLIN HFA) 90 mcg/actuation inhaler Inhale 2 puffs into the lungs every 6 (six) hours as needed for Wheezing. Rescue      benzonatate (TESSALON) 200 MG capsule Take 1 capsule (200 mg total) by mouth 3 (three) times daily as needed for Cough. 30 capsule 1    estradiol (ESTRACE) 0.5 MG tablet Take 1 tablet (0.5 mg total) by mouth once daily. 30 tablet 11    fluconazole (DIFLUCAN) 150 MG Tab q o day. 3 tablet 1     No current facility-administered medications on file prior to visit.        Medications have been reviewed and reconciled with patient at this visit.      Exam:  Wt Readings from Last 3 Encounters:   02/19/19 74.1 kg (163 lb 5.8 oz)   02/19/19 74.1 kg (163 lb 5.8 oz)   02/12/19 70.8 kg (156 lb 1.4 oz)     Temp Readings from Last 3 Encounters:   02/19/19 (!) 101.9 °F (38.8 °C) (Tympanic)   02/12/19 97.8 °F (36.6 °C) (Tympanic)   01/29/19 98.5 °F (36.9 °C) (Tympanic)     BP Readings from Last 3 Encounters:   02/19/19 104/68   02/19/19 110/76   02/12/19 102/66     Pulse Readings from Last 3 Encounters:   02/19/19 (!) 120   02/12/19 60   01/29/19 75     Body mass index is 25.97 kg/m².      Review of Systems   Constitutional: Positive for chills, fever and malaise/fatigue. Negative for weight  loss.   HENT: Positive for congestion and sinus pain. Negative for ear discharge.    Eyes: Negative for blurred vision, double vision and pain.   Respiratory: Positive for cough. Negative for hemoptysis and sputum production.    Cardiovascular: Negative for palpitations.   Gastrointestinal: Negative for abdominal pain, diarrhea and vomiting.   Musculoskeletal: Positive for myalgias. Negative for joint pain and neck pain.   Neurological: Positive for weakness. Negative for dizziness, tingling, tremors, sensory change and speech change.     Physical Exam-alert and oriented well-nourished well-developed bundled up today.  It  HEENT TMs normal, postnasal drip noted and some coryza.  Heart regular rate without murmur  Lungs scattered rhonchi posteriorly  Abdomen soft nontender  Extremities no cyanosis clubbing or edema  Neuro grossly intact        Valerie was seen today for uri.    Diagnoses and all orders for this visit:    Myalgia  -     Influenza A & B by Molecular  -     CBC auto differential; Future    Cough  -     Influenza A & B by Molecular    Other orders  -     oseltamivir (TAMIFLU) 75 MG capsule; Take 1 capsule (75 mg total) by mouth 2 (two) times daily. for 5 days                  Follow up: No Follow-up on file.    After visit summary was printed and given to patient upon discharge today.  Patient goals and care plan are included in After Visit Summary.

## 2019-03-05 NOTE — PROGRESS NOTES
Valerie Rodriguez  03/05/2019  [id number]    Colin Martinez MD  Patient Care Team:  Colin Martinez MD as PCP - General (Family Medicine)  Primary Doctor No        Chief Complaint:  Chief Complaint   Patient presents with    URI       History of Present Illness:  Patient states that she has been having congestion in her sinuses and her chest and throat for the last few weeks.  She apparently went to urgent care on the 6th and was treated with a Z-Jeronimo and possibly prednisone.  She also states that she has to use Diflucan whenever she takes antibiotics because she is prone to vaginitis and yeast infections.  She states that she has had mild shortness of breath with exertion over the past few weeks she thinks is from the congestion. She thinks she may have had mild low-grade fever but has not documented a temp over 100 in the last week.        History:  Past Medical History:   Diagnosis Date    Allergy     Glaucoma     Hyperlipidemia     Migraines     Mitral valve prolapse     Palpitations     Pre-op exam 11/24/2014    Swelling of face     after anesthesia    Transplant donor evaluation 9/25/2014     Past Surgical History:   Procedure Laterality Date    BREAST BIOPSY      BTL      BUNIONECTOMY      bilateral    ENDOMETRIAL ABLATION  2008    EXCISION, MILLER'S NEUROMA Left 4/24/2013    [facility] by [facility] OR    hemorroids      HERNIA REPAIR  2008    HYSTERECTOMY      HYSTERECTOMY-TOTAL LAPAROSCOPIC ([facility]H) N/A 7/19/2016    Performed by [facility] at [facility] OR    HYSTEROSCOPY, WITH DILATION AND CURETTAGE OF UTERUS AND HYDROTHERMAL ENDOMETRIAL ABLATION N/A 10/28/2013    Per[facility] by [facility] at [facility] OR    INGUINAL HERNIA REPAIR  2008    @ same time as endometrial ablation    left foot      left foot x3 addition surgeries 2013; 2011, 2009(bunionectomy)    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    LIGATION, FALLOPIAN TUBE, LAPAROSCOPIC Bilateral 10/28/2013     [facility] by [facility] at [facility] OR    REPAIR-HERNIA-INGUINAL-LAPAROSCOPIC Right 10/28/2013    Performed by Blas Arnold [facility] at [facility] OR    REPAIR-HERNIA-LAPAROSCOPIC-VENTRAL N/A 4/29/2015    Performed by Bl[facility] at [facility] OR    ROBOTIC ASSISTED LAPAROSCOPIC NEPHRECTOMY-DONOR Left 12/2/2014    Performed by Edvin Maravilla MD at [facility] OR 2ND FLR    SALPINGECTOMY-LAPAROSCOPIC  7/19/2016    Performed by [facility] at [facility] OR    TUBAL LIGATION  2008    UMBILICAL HERNIA REPAIR  May 2015     Family History   Problem Relation Age of Onset    Hypertension Mother     Alcohol abuse Mother     Lupus Sister         ESRD s/p a kidney transplant    Hypertension Sister     Narcolepsy Brother     Breast cancer Neg Hx     Colon cancer Neg Hx     Ovarian cancer Neg Hx     Diabetes Neg Hx      Social History     Socioeconomic History    Marital status:      Spouse name: Not on file    Number of children: 5    Years of education: Not on file    Highest education level: Not on file   Social Needs    Financial resource strain: Not on file    Food insecurity - worry: Not on file    Food insecurity - inability: Not on file    Transportation needs - medical: Not on file    Transportation needs - non-medical: Not on file   Occupational History    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     Drug use: No    Sexual activity: Yes     Partners: Male     Birth control/protection: Surgical   Other Topics Concern    Not on file   Social History Narrative    Recently  for about a year    Wants to try again for another baby     Patient Active Problem List   Diagnosis    Palpitations    Mitral valve prolapse    Neuralgic migraines    Hyperlipidemia     Varicose veins    Donor of kidney for transplant-12/2/14    Abdominal pain    Ventral hernia, unspecified, without mention of obstruction or gangrene    Knee pain, left    Quadriceps weakness    Decreased functional mobility    Mixed incontinence urge and stress    Dysuria    Hematuria, gross    Status post hysterectomy    Menopausal state    Chronic vaginitis    Breast pain     Review of patient's allergies indicates:   Allergen Reactions    Adhesive      Clear tape.  Paper Tape= OK.    Other      ALLERGY Pain patches IOther reaction(s): Unknown    Penicillins Itching    Dilaudid [hydromorphone (bulk)] Nausea Only    Latex, natural rubber Rash    Sulfur Rash       The following were reviewed at this visit: active problem list, medication list, allergies, family history, social history, and health maintenance.    Medications:  Current Outpatient Medications on File Prior to Visit   Medication Sig Dispense Refill    albuterol (PROVENTIL) 2.5 mg /3 mL (0.083 %) nebulizer solution       albuterol (VENTOLIN HFA) 90 mcg/actuation inhaler Inhale 2 puffs into the lungs every 6 (six) hours as needed for Wheezing. Rescue      estradiol (ESTRACE) 0.5 MG tablet Take 1 tablet (0.5 mg total) by mouth once daily. 30 tablet 11     No current facility-administered medications on file prior to visit.        Medications have been reviewed and reconciled with patient at this visit.      Exam:  Wt Readings from Last 3 Encounters:   02/19/19 74.1 kg (163 lb 5.8 oz)   02/19/19 74.1 kg (163 lb 5.8 oz)   02/12/19 70.8 kg (156 lb 1.4 oz)     Temp Readings from Last 3 Encounters:   02/19/19 (!) 101.9 °F (38.8 °C) (Tympanic)   02/12/19 97.8 °F (36.6 °C) (Tympanic)   01/29/19 98.5 °F (36.9 °C) (Tympanic)     BP Readings from Last 3 Encounters:   02/19/19 104/68   02/19/19 110/76   02/12/19 102/66     Pulse Readings from Last 3 Encounters:   02/19/19 (!) 120   02/12/19 60   01/29/19 75     Body mass index is 24.82  kg/m².      Review of Systems   Constitutional: Positive for malaise/fatigue. Negative for chills, fever and weight loss.   HENT: Positive for congestion and sinus pain. Negative for ear pain and sore throat.    Eyes: Negative for blurred vision.   Respiratory: Positive for cough and shortness of breath. Negative for sputum production.    Cardiovascular: Positive for chest pain and PND. Negative for leg swelling.   Gastrointestinal: Negative for abdominal pain and vomiting.   Skin: Negative for rash.   Neurological: Negative for headaches.     Physical Exam patient is alert and oriented, well-nourished well-developed, adult female in no acute distress at this time and is ambulatory.  HEENT-TMs intact, postnasal drip noted, no nasal discharge.  Mild tenderness to palpation of maxillary sinuses  Neck is supple  Heart regular rate without murmur  Lungs clear except for few scattered rhonchi  Abdomen soft nontender  Neuro grossly intact        Valerie was seen today for uri.    Diagnoses and all orders for this visit:    Bronchitis  -     Influenza A & B by Molecular  -     X-Ray Chest PA And Lateral; Future    Other orders  -     fluconazole (DIFLUCAN) 150 MG Tab; q o day.  -     benzonatate (TESSALON) 200 MG capsule; Take 1 capsule (200 mg total) by mouth 3 (three) times daily as needed for Cough.                  Follow up: Follow-up in about 1 week (around 2/19/2019) for bronchitis.    After visit summary was printed and given to patient upon discharge today.  Patient goals and care plan are included in After Visit Summary.      Answers for HPI/ROS submitted by the patient on 2/11/2019   Shortness of breath  Chronicity: recurrent  Onset: 1 to 4 weeks ago  Frequency: daily  Progression since onset: waxing and waning  coryza: No  syncope: No  Aggravating factors: any activity  Improvement on treatment: mild  Risk factors for DVT/PE: no known risk factors  Treatments tried: OTC cough suppressants, beta agonist  inhalers  asthma: Yes  allergies: Yes  COPD: No  aspirin allergies: No  CAD: No  DVT: No  PE: No  recent surgery: No  bronchiolitis: No

## 2019-04-11 ENCOUNTER — OFFICE VISIT (OUTPATIENT)
Dept: INTERNAL MEDICINE | Facility: CLINIC | Age: 39
End: 2019-04-11
Payer: MEDICAID

## 2019-04-11 VITALS
BODY MASS INDEX: 25.26 KG/M2 | SYSTOLIC BLOOD PRESSURE: 118 MMHG | HEIGHT: 67 IN | OXYGEN SATURATION: 95 % | HEART RATE: 64 BPM | DIASTOLIC BLOOD PRESSURE: 78 MMHG | WEIGHT: 160.94 LBS | TEMPERATURE: 98 F

## 2019-04-11 DIAGNOSIS — F41.8 INSOMNIA SECONDARY TO DEPRESSION WITH ANXIETY: Primary | ICD-10-CM

## 2019-04-11 DIAGNOSIS — F51.05 INSOMNIA SECONDARY TO DEPRESSION WITH ANXIETY: Primary | ICD-10-CM

## 2019-04-11 PROCEDURE — 99213 OFFICE O/P EST LOW 20 MIN: CPT | Mod: S$PBB,,, | Performed by: FAMILY MEDICINE

## 2019-04-11 PROCEDURE — 99999 PR PBB SHADOW E&M-EST. PATIENT-LVL III: ICD-10-PCS | Mod: PBBFAC,,, | Performed by: FAMILY MEDICINE

## 2019-04-11 PROCEDURE — 99213 PR OFFICE/OUTPT VISIT, EST, LEVL III, 20-29 MIN: ICD-10-PCS | Mod: S$PBB,,, | Performed by: FAMILY MEDICINE

## 2019-04-11 PROCEDURE — 99213 OFFICE O/P EST LOW 20 MIN: CPT | Mod: PBBFAC | Performed by: FAMILY MEDICINE

## 2019-04-11 PROCEDURE — 99999 PR PBB SHADOW E&M-EST. PATIENT-LVL III: CPT | Mod: PBBFAC,,, | Performed by: FAMILY MEDICINE

## 2019-04-11 RX ORDER — ESCITALOPRAM OXALATE 5 MG/1
5 TABLET ORAL DAILY
Qty: 30 TABLET | Refills: 11 | Status: SHIPPED | OUTPATIENT
Start: 2019-04-11 | End: 2019-04-11

## 2019-04-11 RX ORDER — ESCITALOPRAM OXALATE 5 MG/1
5 TABLET ORAL DAILY
Qty: 30 TABLET | Refills: 1 | Status: SHIPPED | OUTPATIENT
Start: 2019-04-11 | End: 2020-03-25

## 2019-04-11 NOTE — PROGRESS NOTES
Valerie Rodriguez  04/11/2019  [id number]    Nathaniel Boyd MD  Patient Care Team:  Nathaniel Boyd MD as PCP - General (Family Medicine)  Primary Doctor No        Chief Complaint:  Chief Complaint   Patient presents with    Anxiety       History of Present Illness:  This patient states that in the last 2 months she has had sinus problems and had the flu but she has resolved with those issues now but her 18-year-old bipolar daughter has been missing for few weeks and she has some other challenges at home as well.  She says that she is very stressed unable to sleep and has some mild depression and anxiety issues.  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  States she has not have suicidal or homicidal ideations.  Her appetite is okay.    We reviewed her labs from 2 months ago which for within normal limits.  The patient states that she has used Xanax occasionally in the past  from a friend but I have recommended that she try something like Lexapro 1st.  She is adamant that she be on the lowest dose possible, and she is scared of gaining weight.  Her depression and certainly some of her stress and insomnia as well appear to be mostly situational related.      History:  Past Medical History:   Diagnosis Date    Allergy     Glaucoma     Hyperlipidemia     Migraines     Mitral valve prolapse     Palpitations     Pre-op exam 11/24/2014    Swelling of face     after anesthesia    Transplant donor evaluation 9/25/2014     Past Surgical History:   Procedure Laterality Date    BREAST BIOPSY      BTL      BUNIONECTOMY      bilateral    ENDOMETRIAL ABLATION  2008    EXCISION, MILLER'S NEUROMA Left 4/24/2013    [facility]    hemorroids      HERNIA REPAIR  2008    HYSTERECTOMY      HYSTERECTOMY-TOTAL LAPAROSCOPIC ([facility]) N/A 7/19/2016    [facility]    HYSTEROSCOPY, WITH DILATION AND CURETTAGE  OF UTERUS AND HYDROTHERMAL ENDOMETRIAL ABLATION N/A 10/28/2013    [facility] at [facility] OR    INGUINAL HERNIA REPAIR  2008    @ same time as endometrial ablation    left foot      left foot x3 addition surgeries 2013; 2011, 2009(bunionectomy)    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    LIGATION, FALLOPIAN TUBE, LAPAROSCOPIC Bilateral 10/28/2013    [facility] at [facility] OR    REPAIR-HERNIA-INGUINAL-LAPAROSCOPIC Right 10/28/2013    [facility] by Bl[facility] at [facility] OR    REPAIR-HERNIA-LAPAROSCOPIC-VENTRAL N/A 4/29/2015    [facility] by [facility] at [facility] OR    ROBOTIC ASSISTED LAPAROSCOPIC NEPHRECTOMY-DONOR Left 12/2/2014    Performed by Edvin [facility]avilla MD [facility] 2ND FLR    SALPINGECTOMY-LAPAROSCOPIC  7/19/2016    [facility] at [facility] OR    TUBAL LIGATION  2008    UMBILICAL HERNIA REPAIR  May 2015     Family History   Problem Relation Age of Onset    Hypertension Mother     Alcohol abuse Mother     Lupus Sister         ESRD s/p a kidney transplant    Hypertension Sister     Narcolepsy Brother     Breast cancer Neg Hx     Colon cancer Neg Hx     Ovarian cancer Neg Hx     Diabetes Neg Hx      Social History     Socioeconomic History    Marital status:      Spouse name: Not on file    Number of children: 5    Years of education: Not on file    Highest education level: Not on file   Occupational History    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      Employer: [employer]   Social Needs    Financial resource strain: Somewhat hard    Food insecurity:     Worry: Sometimes true     Inability: Sometimes true    Transportation needs:     Medical: No     Non-medical: No   Tobacco Use    Smoking status: Never Smoker    Smokeless tobacco: Never Used   Substance and Sexual Activity    Alcohol use: No     Frequency: Never     Drinks per session: Patient refused     Binge frequency: Never     Comment: single. 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     Drug use: No    Sexual activity: Yes     Partners: Male     Birth control/protection: Surgical   Lifestyle    Physical activity:     Days per week: 7 days     Minutes per session: 150+ min    Stress: Very much   Relationships    Social connections:     Talks on phone: More than three times a week     Gets together: More than three times a week     Attends Evangelical service: Not on file     Active member of club or organization: Yes     Attends meetings of clubs or organizations: More than 4 times per year     Relationship status: Patient refused   Other Topics Concern    Not on file   Social History Narrative    Recently  for about a year    Wants to try again for another baby     Patient Active Problem List   Diagnosis    Palpitations    Mitral valve prolapse    Neuralgic migraines    Hyperlipidemia    Varicose veins    Donor of kidney for transplant-12/2/14    Abdominal pain    Ventral hernia, unspecified, without mention of obstruction or gangrene    Knee pain, left    Quadriceps weakness    Decreased functional mobility    Mixed incontinence urge and stress    Dysuria    Hematuria, gross    Status post hysterectomy    Menopausal state    Chronic vaginitis    Breast pain     Review of patient's allergies indicates:   Allergen Reactions    Adhesive      Clear tape.  Paper Tape= OK.    Other      ALLERGY Pain patches IOther reaction(s): Unknown    Penicillins Itching    Dilaudid [hydromorphone (bulk)] Nausea Only    Latex, natural rubber Rash    Sulfur Rash       The following were reviewed at this visit: active problem list, medication list, allergies, family history, social history, and health maintenance.    Medications:  Current Outpatient Medications on File Prior to Visit   Medication Sig Dispense Refill    albuterol (PROVENTIL) 2.5 mg /3 mL (0.083 %) nebulizer solution       albuterol (VENTOLIN HFA) 90 mcg/actuation inhaler Inhale 2  puffs into the lungs every 6 (six) hours as needed for Wheezing. Rescue      estradiol (ESTRACE) 0.5 MG tablet Take 1 tablet (0.5 mg total) by mouth once daily. 30 tablet 11    fluconazole (DIFLUCAN) 150 MG Tab q o day. 3 tablet 1     No current facility-administered medications on file prior to visit.        Medications have been reviewed and reconciled with patient at this visit.      Exam:  Wt Readings from Last 3 Encounters:   04/11/19 73 kg (160 lb 15 oz)   02/19/19 74.1 kg (163 lb 5.8 oz)   02/19/19 74.1 kg (163 lb 5.8 oz)     Temp Readings from Last 3 Encounters:   04/11/19 97.6 °F (36.4 °C)   02/19/19 (!) 101.9 °F (38.8 °C) (Tympanic)   02/12/19 97.8 °F (36.6 °C) (Tympanic)     BP Readings from Last 3 Encounters:   04/11/19 118/78   02/19/19 104/68   02/19/19 110/76     Pulse Readings from Last 3 Encounters:   04/11/19 64   02/19/19 (!) 120   02/12/19 60     Body mass index is 25.59 kg/m².      Review of Systems   HENT: Negative for hearing loss.    Eyes: Negative for discharge.   Respiratory: Negative for wheezing.    Cardiovascular: Positive for palpitations. Negative for chest pain.   Gastrointestinal: Negative for blood in stool, constipation, diarrhea and vomiting.   Genitourinary: Negative for dysuria and hematuria.   Musculoskeletal: Negative for neck pain.   Neurological: Positive for headaches. Negative for weakness.   Endo/Heme/Allergies: Negative for polydipsia.   Psychiatric/Behavioral: Negative for suicidal ideas. The patient is nervous/anxious and has insomnia.      Physical Exam alert and oriented well-nourished well-developed, ambulatory pleasant adult female in no acute distress  Heart rate and rhythm without murmur  Lungs clear  Abdomen soft nontender  Psychiatric good affect and cognition      Valerie was seen today for anxiety.    Diagnoses and all orders for this visit:    Insomnia secondary to depression with anxiety    Other orders  -     Discontinue: escitalopram oxalate (LEXAPRO)  5 MG Tab; Take 1 tablet (5 mg total) by mouth once daily.  -     escitalopram oxalate (LEXAPRO) 5 MG Tab; Take 1 tablet (5 mg total) by mouth once daily.                  Follow up: Follow up in about 3 weeks (around 5/2/2019).    After visit summary was printed and given to patient upon discharge today.  Patient goals and care plan are included in After Visit Summary.      Answers for HPI/ROS submitted by the patient on 4/9/2019   activity change: Yes  unexpected weight change: No  rhinorrhea: Yes  trouble swallowing: No  visual disturbance: No  chest tightness: Yes  polyuria: No  difficulty urinating: No  menstrual problem: No  joint swelling: No  arthralgias: No  confusion: No  dysphoric mood: Yes

## 2019-08-20 ENCOUNTER — TELEPHONE (OUTPATIENT)
Dept: OBSTETRICS AND GYNECOLOGY | Facility: CLINIC | Age: 39
End: 2019-08-20

## 2019-08-20 NOTE — TELEPHONE ENCOUNTER
----- Message from Brittni Daniels sent at 8/20/2019  4:03 PM CDT -----  Contact: self  needs to schedule wwe..[phone] (home)

## 2019-08-20 NOTE — TELEPHONE ENCOUNTER
Spoke to patient and scheduled her appointment for 08/21/19 at 8:15am to see KITTY Hu at the [place] location. Patient verbalized understanding.

## 2019-08-21 ENCOUNTER — OFFICE VISIT (OUTPATIENT)
Dept: OBSTETRICS AND GYNECOLOGY | Facility: CLINIC | Age: 39
End: 2019-08-21
Payer: MEDICAID

## 2019-08-21 ENCOUNTER — TELEPHONE (OUTPATIENT)
Dept: OBSTETRICS AND GYNECOLOGY | Facility: CLINIC | Age: 39
End: 2019-08-21

## 2019-08-21 VITALS
HEIGHT: 67 IN | DIASTOLIC BLOOD PRESSURE: 74 MMHG | WEIGHT: 173.5 LBS | SYSTOLIC BLOOD PRESSURE: 122 MMHG | BODY MASS INDEX: 27.23 KG/M2

## 2019-08-21 DIAGNOSIS — Z90.710 STATUS POST HYSTERECTOMY: ICD-10-CM

## 2019-08-21 DIAGNOSIS — R10.2 PELVIC PAIN IN FEMALE: ICD-10-CM

## 2019-08-21 DIAGNOSIS — Z01.419 GYNECOLOGIC EXAM NORMAL: ICD-10-CM

## 2019-08-21 DIAGNOSIS — R92.0 ABNORMAL MAMMOGRAM WITH MICROCALCIFICATION: ICD-10-CM

## 2019-08-21 DIAGNOSIS — N64.4 BREAST PAIN, LEFT: Primary | ICD-10-CM

## 2019-08-21 DIAGNOSIS — N95.1 MENOPAUSAL STATE: ICD-10-CM

## 2019-08-21 PROCEDURE — 87481 CANDIDA DNA AMP PROBE: CPT | Mod: 59

## 2019-08-21 PROCEDURE — 99395 PR PREVENTIVE VISIT,EST,18-39: ICD-10-PCS | Mod: S$PBB,,, | Performed by: NURSE PRACTITIONER

## 2019-08-21 PROCEDURE — 99999 PR PBB SHADOW E&M-EST. PATIENT-LVL III: ICD-10-PCS | Mod: PBBFAC,,, | Performed by: NURSE PRACTITIONER

## 2019-08-21 PROCEDURE — 87801 DETECT AGNT MULT DNA AMPLI: CPT

## 2019-08-21 PROCEDURE — 99213 OFFICE O/P EST LOW 20 MIN: CPT | Mod: PBBFAC | Performed by: NURSE PRACTITIONER

## 2019-08-21 PROCEDURE — 87661 TRICHOMONAS VAGINALIS AMPLIF: CPT

## 2019-08-21 PROCEDURE — 99999 PR PBB SHADOW E&M-EST. PATIENT-LVL III: CPT | Mod: PBBFAC,,, | Performed by: NURSE PRACTITIONER

## 2019-08-21 PROCEDURE — 99395 PREV VISIT EST AGE 18-39: CPT | Mod: S$PBB,,, | Performed by: NURSE PRACTITIONER

## 2019-08-21 PROCEDURE — 87086 URINE CULTURE/COLONY COUNT: CPT

## 2019-08-21 RX ORDER — ALPRAZOLAM 0.5 MG/1
TABLET ORAL
Refills: 4 | COMMUNITY
Start: 2019-08-19

## 2019-08-21 RX ORDER — ONDANSETRON 4 MG/1
TABLET, ORALLY DISINTEGRATING ORAL
Refills: 2 | COMMUNITY
Start: 2019-08-19 | End: 2020-09-21 | Stop reason: SDUPTHER

## 2019-08-21 NOTE — TELEPHONE ENCOUNTER
Please call pt to schedule appointment with JENNIFER Bryson Np for breast pain, hx of breast biopsy.

## 2019-08-21 NOTE — PROGRESS NOTES
"CC: Well woman exam    Valerie Rodriguez is a 38 y.o. female  presents for a well woman exam.  Patient is s/p benign hysterotomy with  Ovary conservation. Is sexually active. On HRT, estradiol 0.5mg /daily.Patient has a h/o right and left breast bx and is reporting" a left breast mass". No nipple discharge. Patient reports mild pelvic pain for " weeks, it is on and off". Nothing makes it worse or better. Urine in clinic was negative.    Past Medical History:   Diagnosis Date    Allergy     Glaucoma     Hyperlipidemia     Migraines     Mitral valve prolapse     Palpitations     Pre-op exam 2014    Swelling of face     after anesthesia    Transplant donor evaluation 2014     Past Surgical History:   Procedure Laterality Date    BREAST BIOPSY      BTL      BUNIONECTOMY      bilateral    ENDOMETRIAL ABLATION      EXCISION, MILLER'S NEUROMA Left 2013    [facility]    hemorroids      HERNIA REPAIR      HYSTERECTOMY      HYSTERECTOMY-TOTAL LAPAROSCOPIC ([facility]) N/A 2016    [facility] OR    HYSTEROSCOPY, WITH DILATION AND CURETTAGE OF UTERUS AND HYDROTHERMAL ENDOMETRIAL ABLATION N/A 10/28/2013    [facility] at [facility] OR    INGUINAL HERNIA REPAIR      @ same time as endometrial ablation    left foot      left foot x3 addition surgeries ; , (bunionectomy)    Left kidney donation  2014    LIGATION, FALLOPIAN TUBE, LAPAROSCOPIC Bilateral 10/28/2013    [facility] at [facility] OR    REPAIR-HERNIA-INGUINAL-LAPAROSCOPIC Right 10/28/2013    [facility]formed by Blas Arnold [facility] at [facility] OR    REPAIR-HERNIA-LAPAROSCOPIC-VENTRAL N/A 2015    [facility] at [facility] OR    ROBOTIC ASSISTED LAPAROSCOPIC NEPHRECTOMY-DONOR Left 2014    [facility] 2ND FLR    SALPINGECTOMY-LAPAROSCOPIC  2016    " "[facility]    TUBAL LIGATION      UMBILICAL HERNIA REPAIR  May 2015     Family History   Problem Relation Age of Onset    Hypertension Mother     Alcohol abuse Mother     Lupus Sister         ESRD s/p a kidney transplant    Hypertension Sister     Narcolepsy Brother     Breast cancer Neg Hx     Colon cancer Neg Hx     Ovarian cancer Neg Hx     Diabetes Neg Hx      Social History     Tobacco Use    Smoking status: Never Smoker    Smokeless tobacco: Never Used   Substance Use Topics    Alcohol use: No     Frequency: Never     Drinks per session: Patient refused     Binge frequency: Never     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     Drug use: No     OB History        5    Para   5    Term   4       1    AB   0    Living   5       SAB   0    TAB   0    Ectopic   0    Multiple   0    Live Births   5                 /74   Ht 5' 6.5" (1.689 m)   Wt 78.7 kg (173 lb 8 oz)   LMP 2015   BMI 27.58 kg/m²     ROS:  GENERAL: Denies weight gain or weight loss. Feeling well overall.   SKIN: Denies rash or lesions.   HEAD: Denies head injury or headache.   NODES: Denies enlarged lymph nodes.   CHEST: Denies chest pain or shortness of breath.   CARDIOVASCULAR: Denies palpitations or left sided chest pain.   ABDOMEN: HPI  URINARY: No frequency, dysuria, hematuria, or burning on urination.  REPRODUCTIVE: See HPI.   BREASTS: HPI  HEMATOLOGIC: No easy bruisability or excessive bleeding.   MUSCULOSKELETAL: Denies joint pain or swelling.   NEUROLOGIC: Denies syncope or weakness.   PSYCHIATRIC: Denies depression, anxiety or mood swings.    PE:   APPEARANCE: Well nourished, well developed, in no acute distress.  AFFECT: WNL, alert and oriented x 3.  SKIN: No acne or hirsutism.  NECK: Neck symmetric without masses or thyromegaly.  NODES: No inguinal, cervical, axillary or femoral lymph node enlargement.  CHEST: Good respiratory effort.   ABDOMEN: " Soft. No tenderness or masses. No hepatosplenomegaly. No hernias.  BREASTS: Symmetrical, no skin changes or visible lesions. No palpable masses, nipple discharge bilaterally.  PELVIC: Normal external female genitalia without lesions. Normal hair distribution. Adequate perineal body, normal urethral meatus. Vagina atrophic without lesions or discharge. No significant cystocele or rectocele. Bimanual exam shows uterus and cervix to be surgically absent. Adnexa without masses or tenderness.  RECTAL: Rectovaginal exam confirms above with normal sphincter tone, no masses.  EXTREMITIES: No edema.     PLAN:  Exam was unremarkable for pelvic pain and left breast mass   Urine and Affirm cx  Pelvic ultrasound  Referral to JENNIFER Bryson NP   Risk and benefits of HRT discussed for 15 minutes   Patient was counseled today on A.C.S. Pap guidelines and recommendations for yearly pelvic exams, mammograms and monthly self breast exams; to see her PCP for other health maintenance.

## 2019-08-22 LAB
BACTERIA UR CULT: NO GROWTH
BACTERIAL VAGINOSIS DNA: POSITIVE
CANDIDA GLABRATA DNA: NEGATIVE
CANDIDA KRUSEI DNA: NEGATIVE
CANDIDA RRNA VAG QL PROBE: NEGATIVE
T VAGINALIS RRNA GENITAL QL PROBE: NEGATIVE

## 2019-08-22 RX ORDER — METRONIDAZOLE 500 MG/1
500 TABLET ORAL 2 TIMES DAILY
Qty: 14 TABLET | Refills: 0 | Status: SHIPPED | OUTPATIENT
Start: 2019-08-22 | End: 2019-08-29 | Stop reason: ALTCHOICE

## 2019-08-26 ENCOUNTER — HOSPITAL ENCOUNTER (OUTPATIENT)
Dept: RADIOLOGY | Facility: HOSPITAL | Age: 39
Discharge: HOME | End: 2019-08-26
Attending: NURSE PRACTITIONER
Payer: MEDICAID

## 2019-08-26 DIAGNOSIS — R10.2 PELVIC PAIN IN FEMALE: ICD-10-CM

## 2019-08-27 NOTE — PROGRESS NOTES
Patient ID: Valerie Rodriguez is a 38 y.o. female.    Chief Complaint: breast lump and pain    Collaborating provider is Manuel Turner MD    HPI: Patient presents for the evaluation of lore breast pain and possible left breast lump. Referred by TA Colvin    Pt relates about 2 weeks ago in the shower noting an area of concern left breast 12 oclock. Denies any change in the area since first noting. Denies any trauma or bruising in the area in the past.     Has had lore breast biopsies in the past that were benign.    States has had bilateral intermittent breast soreness for a few years. Stopped estrogen about one month ago due to the breast pain. No real change yet. Wears a good support bra and at home wears sports bra. No change in caffeine intake.    Risk factors identified:     Menarche at 8 y/o  G 5 P 5  First pregnancy at 17 y/o  LMP: 3 years ago partial hyst  Estrogen: 3 years on estrace- stopped one month ago  Radiation to the neck or chest wall- none  Prior breast biopsies or atypical hyperplasia- right breast biopsy 7/11/18 for suspicious calcifications. Left breast biopsy 3 years ago benign    Right breast Path  Right breast, upper some central region middle depth (needle biopsy):  Benign breast with fibroadenomatoid changes and mild fibrocystic changes  Rare epithelial microcalcifications  Patchy mild periductal chronic inflammation    ETOH: none     FH: no breast cancer, maternal grandmother lung cancer, father hodgkins lymphoma X 2 at ? age    Body mass index is 27.09 kg/m².    Review of Systems   Constitutional: Negative.    HENT: Negative.    Eyes: Negative.    Respiratory: Negative.    Cardiovascular: Negative.    Gastrointestinal: Negative.         No reflux   Endocrine: Negative.    Genitourinary: Negative.    Musculoskeletal: Negative.    Skin: Negative.    Allergic/Immunologic: Negative.    Neurological: Negative.    Hematological: Negative.  Negative for adenopathy.    Psychiatric/Behavioral: Negative.    Breast: Pt nipple discharge thinks felt a palpable mass left breast a couple of weeks ago- has not changed since noting. Has bilateral breast tenderness for years intermttently. No prior trauma or bruising. No breast surgeries or abnormalities.    Current Outpatient Medications   Medication Sig Dispense Refill    albuterol (PROVENTIL) 2.5 mg /3 mL (0.083 %) nebulizer solution       albuterol (VENTOLIN HFA) 90 mcg/actuation inhaler Inhale 2 puffs into the lungs every 6 (six) hours as needed for Wheezing. Rescue      ALPRAZolam (XANAX) 0.5 MG tablet TK 1 T PO QD PRN  4    BOTOX 200 unit SolR       escitalopram oxalate (LEXAPRO) 5 MG Tab Take 1 tablet (5 mg total) by mouth once daily. 30 tablet 1    ondansetron (ZOFRAN-ODT) 4 MG TbDL DIS ONE T PO Q 6 H PRN N  2     No current facility-administered medications for this visit.        Review of patient's allergies indicates:   Allergen Reactions    Adhesive      Clear tape.  Paper Tape= OK.    Other      ALLERGY Pain patches IOther reaction(s): Unknown    Penicillins Itching    Dilaudid [hydromorphone (bulk)] Nausea Only    Latex, natural rubber Rash    Sulfur Rash       Past Medical History:   Diagnosis Date    Allergy     Glaucoma     Hyperlipidemia     Migraines     Mitral valve prolapse     Palpitations     Pre-op exam 11/24/2014    Swelling of face     after anesthesia    Transplant donor evaluation 9/25/2014       Past Surgical History:   Procedure Laterality Date    BREAST BIOPSY      BTL      BUNIONECTOMY      bilateral    ENDOMETRIAL ABLATION  2008    EXCISION, MILLER'S NEUROMA Left 4/24/2013    [facility] OR    hemorroids      HERNIA REPAIR  2008    HYSTERECTOMY      HYSTERECTOMY-TOTAL LAPAROSCOPIC ([facility]H) N/A 7/19/2016    [facility] OR    HYSTEROSCOPY, WITH DILATION AND CURETTAGE OF UTERUS AND HYDROTHERMAL ENDOMETRIAL ABLATION N/A  10/28/2013    [facility] OR    INGUINAL HERNIA REPAIR  2008    @ same time as endometrial ablation    left foot      left foot x3 addition surgeries 2013; 2011, 2009(bunionectomy)    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    LIGATION, FALLOPIAN TUBE, LAPAROSCOPIC Bilateral 10/28/2013    [facility] at [facility] OR    REPAIR-HERNIA-INGUINAL-LAPAROSCOPIC Right 10/28/2013    [facility] at [facility] OR    REPAIR-HERNIA-LAPAROSCOPIC-VENTRAL N/A 4/29/2015    [facility] at [facility] OR    ROBOTIC ASSISTED LAPAROSCOPIC NEPHRECTOMY-DONOR Left 12/2/2014    [facility] 2ND FLR    SALPINGECTOMY-LAPAROSCOPIC  7/19/2016    [facility] at [facility] OR    TUBAL LIGATION  2008    UMBILICAL HERNIA REPAIR  May 2015       Family History   Problem Relation Age of Onset    Hypertension Mother     Alcohol abuse Mother     Lupus Sister         ESRD s/p a kidney transplant    Hypertension Sister     Narcolepsy Brother     Breast cancer Neg Hx     Colon cancer Neg Hx     Ovarian cancer Neg Hx     Diabetes Neg Hx        Social History     Socioeconomic History    Marital status:      Spouse name: Not on file    Number of children: 5    Years of education: Not on file    Highest education level: Not on file   Occupational History    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     Drug use: No     Sexual activity: Yes     Partners: Male     Birth control/protection: Surgical   Lifestyle    Physical activity:     Days per week: 7 days     Minutes per session: 150+ min    Stress: Very much   Relationships    Social connections:     Talks on phone: More than three times a week     Gets together: More than three times a week     Attends Holiness service: Not on file     Active member of club or organization: Yes     Attends meetings of clubs or organizations: More than 4 times per year     Relationship status: Patient refused   Other Topics Concern    Not on file   Social History Narrative    Recently  for about a year    Wants to try again for another baby       Vitals:    08/29/19 0926   BP: 115/76   Pulse: 72   Temp: 98.6 °F (37 °C)       Physical Exam   Constitutional: She is oriented to person, place, and time. She appears well-developed and well-nourished.   HENT:   Head: Normocephalic and atraumatic.   Right Ear: External ear normal.   Left Ear: External ear normal.   Mouth/Throat: No oropharyngeal exudate.   Eyes: Pupils are equal, round, and reactive to light. Conjunctivae and EOM are normal. Right eye exhibits no discharge. Left eye exhibits no discharge. No scleral icterus.   Neck: Normal range of motion. Neck supple. No thyromegaly present.   Cardiovascular: Normal rate, regular rhythm and normal heart sounds.   Pulmonary/Chest: Effort normal and breath sounds normal. Right breast exhibits no inverted nipple, no mass, no nipple discharge, no skin change and no tenderness. Left breast exhibits no inverted nipple, no mass, no nipple discharge, no skin change and no tenderness.   Abdominal: Soft. Bowel sounds are normal.   Musculoskeletal: Normal range of motion. She exhibits no edema.        Right shoulder: She exhibits no crepitus and normal strength.   Lymphadenopathy:        Head (right side): No submental, no submandibular, no tonsillar, no preauricular, no posterior auricular and no  occipital adenopathy present.        Head (left side): No submental, no submandibular, no tonsillar, no preauricular, no posterior auricular and no occipital adenopathy present.     She has no cervical adenopathy.        Right cervical: No superficial cervical and no posterior cervical adenopathy present.       Left cervical: No superficial cervical and no posterior cervical adenopathy present.     She has no axillary adenopathy.        Right: No supraclavicular adenopathy present.        Left: No supraclavicular adenopathy present.   Neurological: She is alert and oriented to person, place, and time. She has normal reflexes.   Skin: Skin is warm and dry. No rash noted. No erythema. No pallor.   Psychiatric: She has a normal mood and affect. Her behavior is normal. Judgment and thought content normal.     1/16/19 right mammo- 6 mon f/u after benign core- stable findings- lore mammo recommended at age 41 y/o     Assessment & Plan:  Mastodynia    Fibrocystic breast changes, bilateral      1. Negative clinical findings on exam. Areas of concern left breast reveal no palpable mass- soft fibrous tissue- fibrocystic areas.   2. Negative imaging- 1/2019-   3. Breast pain- potential causes such as hormonal meds, caffeine and PA can exacerbate pain- discussed symptomatic relief- good support bra, tylenol, warm compresses, decreasing caffeine intake  4. Next imaging due: at age 40 screening mammo  5. BSE technique was demonstrated and explained. Related what to look and feel for on exam monthly. Pt verbalized understanding and return demonstrated proper technique and knowledge of what to look for on self-exam. Pt instructed to call if notes any changes. Contact information given.   30 minutes was spent with this patient and 75% was spent identifying risk factors, reviewing records and educating pt regarding risk reduction strategies and planning future screenings.

## 2019-08-29 ENCOUNTER — OFFICE VISIT (OUTPATIENT)
Dept: HEMATOLOGY/ONCOLOGY | Facility: CLINIC | Age: 39
End: 2019-08-29
Payer: MEDICAID

## 2019-08-29 VITALS
SYSTOLIC BLOOD PRESSURE: 115 MMHG | HEART RATE: 72 BPM | WEIGHT: 170.44 LBS | TEMPERATURE: 99 F | DIASTOLIC BLOOD PRESSURE: 76 MMHG | BODY MASS INDEX: 27.09 KG/M2

## 2019-08-29 DIAGNOSIS — N60.12 FIBROCYSTIC BREAST CHANGES, BILATERAL: ICD-10-CM

## 2019-08-29 DIAGNOSIS — N60.11 FIBROCYSTIC BREAST CHANGES, BILATERAL: ICD-10-CM

## 2019-08-29 DIAGNOSIS — N64.4 MASTODYNIA: Primary | ICD-10-CM

## 2019-08-29 PROCEDURE — 99999 PR PBB SHADOW E&M-EST. PATIENT-LVL III: CPT | Mod: PBBFAC,,, | Performed by: NURSE PRACTITIONER

## 2019-08-29 PROCEDURE — 99214 PR OFFICE/OUTPT VISIT, EST, LEVL IV, 30-39 MIN: ICD-10-PCS | Mod: S$PBB,,, | Performed by: NURSE PRACTITIONER

## 2019-08-29 PROCEDURE — 99213 OFFICE O/P EST LOW 20 MIN: CPT | Mod: PBBFAC | Performed by: NURSE PRACTITIONER

## 2019-08-29 PROCEDURE — 99214 OFFICE O/P EST MOD 30 MIN: CPT | Mod: S$PBB,,, | Performed by: NURSE PRACTITIONER

## 2019-08-29 PROCEDURE — 99999 PR PBB SHADOW E&M-EST. PATIENT-LVL III: ICD-10-PCS | Mod: PBBFAC,,, | Performed by: NURSE PRACTITIONER

## 2019-08-29 RX ORDER — ONABOTULINUMTOXINA 200 [USP'U]/1
INJECTION, POWDER, LYOPHILIZED, FOR SOLUTION INTRADERMAL; INTRAMUSCULAR
COMMUNITY
Start: 2019-08-22

## 2019-08-29 NOTE — LETTER
August 29, 2019      Mayte Zavaleta NP  [address] [place] [address]            [facility]  [address]  [place] [address]  Phone: [phone]  Fax: [phone]          Patient: Valerie Rodriguez   MR Number: [id number]   YOB: 1980   Date of Visit: 8/29/2019       Dear Mayte Zavaleta:    Thank you for referring Valerie Rodriguez to me for evaluation. Attached you will find relevant portions of my assessment and plan of care.    If you have questions, please do not hesitate to call me. I look forward to following Valerie Rodriguez along with you.    Sincerely,    Adeline Bryson NP    Enclosure  CC:  No Recipients    If you would like to receive this communication electronically, please contact [email or web address] or [phone] to request more information on [employer] Link access.    For providers and/or their staff who would like to refer a patient to [facility], please contact us through our one-stop-shop provider referral line, [facility], at [phone].    If you feel you have received this communication in error or would no longer like to receive these types of communications, please e-mail [email or web address]

## 2020-03-11 ENCOUNTER — OFFICE VISIT (OUTPATIENT)
Dept: FAMILY MEDICINE | Facility: CLINIC | Age: 40
End: 2020-03-11
Payer: MEDICAID

## 2020-03-11 VITALS
BODY MASS INDEX: 25.81 KG/M2 | DIASTOLIC BLOOD PRESSURE: 60 MMHG | RESPIRATION RATE: 20 BRPM | OXYGEN SATURATION: 100 % | HEIGHT: 67 IN | WEIGHT: 164.44 LBS | SYSTOLIC BLOOD PRESSURE: 99 MMHG | HEART RATE: 76 BPM | TEMPERATURE: 99 F

## 2020-03-11 DIAGNOSIS — J30.9 ALLERGIC RHINITIS, UNSPECIFIED SEASONALITY, UNSPECIFIED TRIGGER: ICD-10-CM

## 2020-03-11 DIAGNOSIS — K43.9 VENTRAL HERNIA WITHOUT OBSTRUCTION OR GANGRENE: Primary | ICD-10-CM

## 2020-03-11 PROCEDURE — 99214 OFFICE O/P EST MOD 30 MIN: CPT | Mod: S$PBB,,, | Performed by: NURSE PRACTITIONER

## 2020-03-11 PROCEDURE — 99999 PR PBB SHADOW E&M-EST. PATIENT-LVL IV: CPT | Mod: PBBFAC,,, | Performed by: NURSE PRACTITIONER

## 2020-03-11 PROCEDURE — 99214 PR OFFICE/OUTPT VISIT, EST, LEVL IV, 30-39 MIN: ICD-10-PCS | Mod: S$PBB,,, | Performed by: NURSE PRACTITIONER

## 2020-03-11 PROCEDURE — 99214 OFFICE O/P EST MOD 30 MIN: CPT | Mod: PBBFAC,PN | Performed by: NURSE PRACTITIONER

## 2020-03-11 PROCEDURE — 99999 PR PBB SHADOW E&M-EST. PATIENT-LVL IV: ICD-10-PCS | Mod: PBBFAC,,, | Performed by: NURSE PRACTITIONER

## 2020-03-11 RX ORDER — FLUTICASONE PROPIONATE 50 MCG
1 SPRAY, SUSPENSION (ML) NASAL DAILY
Qty: 15.8 ML | Refills: 0 | Status: SHIPPED | OUTPATIENT
Start: 2020-03-11 | End: 2020-04-07

## 2020-03-11 NOTE — PROGRESS NOTES
Routine Office Visit    Patient Name: Valerie Rodriguez    : 1980  MRN: [id number]    Chief Complaint:  Allergies, hernia    Subjective:  Valerie is a 39 y.o. female who presents today for:    1.  Allergies - endorses 1-2 weeks intermittent allergy symptoms of runny nose, nasal congestion, postnasal drip, and slight productive cough.  Has been using daily Zyrtec for the symptoms without significant relief.    2.  Hernia - she would also like to discuss her hernias.  She states that she has had 2 separate hernias which started after her kidney donation back in .  She states that had a hernia repair near her umbilicus and then she had hernia repair on the right lower quadrant.  She states that 1 month ago she was diagnosed with the flu and was coughing significantly and subsequently she developed right lower quadrant abdominal pain which she believes is due to another hernia.  Endorses some constipation but denies any fevers or chills.  Denies any blood in the stool or melena.    Past Medical History  Past Medical History:   Diagnosis Date    Allergy     Glaucoma     Hyperlipidemia     Migraines     Mitral valve prolapse     Palpitations     Pre-op exam 2014    Swelling of face     after anesthesia    Transplant donor evaluation 2014       Past Surgical History  Past Surgical History:   Procedure Laterality Date    BREAST BIOPSY      BTL      BUNIONECTOMY      bilateral    ENDOMETRIAL ABLATION  2008    hemorroids      HERNIA REPAIR  2008    HYSTERECTOMY      INGUINAL HERNIA REPAIR  2008    @ same time as endometrial ablation    left foot      left foot x3 addition surgeries ; , (bunionectomy)    Left kidney donation  2014    TUBAL LIGATION  2008    UMBILICAL HERNIA REPAIR  May 2015       Family History  Family History   Problem Relation Age of Onset    Hypertension Mother     Alcohol abuse Mother     Lupus Sister         ESRD s/p a kidney  transplant    Hypertension Sister     Narcolepsy Brother     Breast cancer Neg Hx     Colon cancer Neg Hx     Ovarian cancer Neg Hx     Diabetes Neg Hx        Social History  Social History     Socioeconomic History    Marital status:      Spouse name: Not on file    Number of children: 5    Years of education: Not on file    Highest education level: Not on file   Occupational History    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     Drug use: No    Sexual activity: Yes     Partners: Male     Birth control/protection: Surgical   Lifestyle    Physical activity:     Days per week: 7 days     Minutes per session: 150+ min    Stress: Very much   Relationships    Social connections:     Talks on phone: More than three times a week     Gets together: More than three times a week     Attends Buddhist service: Not on file     Active member of club or organization: Yes     Attends meetings of clubs or organizations: More than 4 times per year     Relationship status: Patient refused   Other Topics Concern    Not on file   Social History Narrative    Recently  for about a year    Wants to try again for another baby       Current Medications  Current Outpatient Medications on File Prior to Visit   Medication Sig Dispense Refill    ALPRAZolam (XANAX) 0.5 MG tablet TK 1 T PO QD PRN  4    BOTOX 200 unit SolR       albuterol (PROVENTIL) 2.5 mg /3 mL (0.083 %) nebulizer solution       albuterol  "(VENTOLIN HFA) 90 mcg/actuation inhaler Inhale 2 puffs into the lungs every 6 (six) hours as needed for Wheezing. Rescue      escitalopram oxalate (LEXAPRO) 5 MG Tab Take 1 tablet (5 mg total) by mouth once daily. (Patient not taking: Reported on 3/11/2020) 30 tablet 1    ondansetron (ZOFRAN-ODT) 4 MG TbDL DIS ONE T PO Q 6 H PRN N  2     No current facility-administered medications on file prior to visit.        Allergies   Review of patient's allergies indicates:   Allergen Reactions    Adhesive      Clear tape.  Paper Tape= OK.    Other      ALLERGY Pain patches IOther reaction(s): Unknown    Penicillins Itching    Dilaudid [hydromorphone (bulk)] Nausea Only    Latex, natural rubber Rash    Sulfur Rash       Review of Systems (Pertinent positives)  Review of Systems   Constitutional: Negative for chills and fever.   HENT: Positive for congestion.         +rhinorrhea, +postnasal drip   Eyes: Negative.    Respiratory: Positive for cough. Negative for sputum production.    Cardiovascular: Negative.    Gastrointestinal: Positive for abdominal pain and constipation. Negative for blood in stool, diarrhea, heartburn, melena, nausea and vomiting.   Genitourinary: Negative.    Musculoskeletal: Negative.    Skin: Negative.    Neurological: Negative.    Endo/Heme/Allergies: Negative.        BP 99/60 (BP Location: Right arm, Patient Position: Sitting, BP Method: Small (Automatic))   Pulse 76   Temp 99.1 °F (37.3 °C) (Oral)   Resp 20   Ht 5' 6.5" (1.689 m)   Wt 74.6 kg (164 lb 7.4 oz)   LMP 12/25/2015   SpO2 100%   BMI 26.15 kg/m²     Physical Exam   Constitutional: She is oriented to person, place, and time. She appears well-developed and well-nourished.  Non-toxic appearance. She does not have a sickly appearance. She does not appear ill. No distress.   Patient resting comfortably in examination room in no acute distress, conversing appropriate   HENT:   Head: Normocephalic and atraumatic.   Right Ear: " Tympanic membrane, external ear and ear canal normal.   Left Ear: Tympanic membrane, external ear and ear canal normal.   Nose: Mucosal edema and rhinorrhea present. Right sinus exhibits no maxillary sinus tenderness and no frontal sinus tenderness. Left sinus exhibits no maxillary sinus tenderness and no frontal sinus tenderness.   Mouth/Throat: Uvula is midline and mucous membranes are normal. No oropharyngeal exudate, posterior oropharyngeal edema, posterior oropharyngeal erythema or tonsillar abscesses. Tonsils are 0 on the right. Tonsils are 0 on the left. No tonsillar exudate.       Eyes: Pupils are equal, round, and reactive to light. Conjunctivae and EOM are normal.   Neck: Normal range of motion. Neck supple. No JVD present.   Cardiovascular: Normal rate, regular rhythm, normal heart sounds and intact distal pulses. Exam reveals no gallop and no friction rub.   No murmur heard.  Pulmonary/Chest: Effort normal and breath sounds normal. No stridor. No respiratory distress. She has no wheezes. She has no rales. She exhibits no tenderness.   Abdominal: Soft. Bowel sounds are normal. She exhibits no distension, no ascites and no mass. There is tenderness. There is no rigidity, no rebound, no guarding, no tenderness at McBurney's point and negative Reed's sign. A hernia is present. Hernia confirmed positive in the ventral area.       Lymphadenopathy:     She has no cervical adenopathy.   Neurological: She is alert and oriented to person, place, and time.   Skin: Skin is warm and dry. Capillary refill takes less than 2 seconds. She is not diaphoretic.   Vitals reviewed.       Assessment/Plan:  Valerie Rodriguez is a 39 y.o. female who presents today for :    Valerie was seen today for hernia, nasal congestion and allergies.    Diagnoses and all orders for this visit:    Ventral hernia without obstruction or gangrene  -     Ambulatory referral/consult to General Surgery; Future    Given extensive  history of surgical repair due to hernias will refer to general surgery for further evaluation.  No signs of incarceration or strangulation noted.    Allergic rhinitis, unspecified seasonality, unspecified trigger  -     fluticasone propionate (FLONASE) 50 mcg/actuation nasal spray; 1 spray (50 mcg total) by Each Nostril route once daily.    Daily nasal steroid continue daily antihistamine as well.    Patient would like to be set up with her previous PCP here.  Will set up patient with a physical with her previous PCP.  She understands she can follow-up in the meantime or contact the clinic for any further concerns.    Patient verbalized understanding of instructions.        This office note has been dictated.  This dictation has been generated using M-Modal Fluency Direct dictation; some phonetic errors may occur.   My collaborating physician is Dr. Kevin Perez.

## 2020-03-25 ENCOUNTER — OFFICE VISIT (OUTPATIENT)
Dept: SURGERY | Facility: CLINIC | Age: 40
End: 2020-03-25
Payer: MEDICAID

## 2020-03-25 VITALS
WEIGHT: 159 LBS | DIASTOLIC BLOOD PRESSURE: 72 MMHG | HEIGHT: 67 IN | SYSTOLIC BLOOD PRESSURE: 110 MMHG | HEART RATE: 85 BPM | BODY MASS INDEX: 24.96 KG/M2 | TEMPERATURE: 97 F

## 2020-03-25 DIAGNOSIS — K43.9 VENTRAL HERNIA WITHOUT OBSTRUCTION OR GANGRENE: ICD-10-CM

## 2020-03-25 DIAGNOSIS — R10.31 RIGHT LOWER QUADRANT ABDOMINAL PAIN: Primary | ICD-10-CM

## 2020-03-25 PROCEDURE — 99214 OFFICE O/P EST MOD 30 MIN: CPT | Mod: S$GLB,,, | Performed by: SURGERY

## 2020-03-25 PROCEDURE — 99214 PR OFFICE/OUTPT VISIT, EST, LEVL IV, 30-39 MIN: ICD-10-PCS | Mod: S$GLB,,, | Performed by: SURGERY

## 2020-03-25 RX ORDER — NITROFURANTOIN 25; 75 MG/1; MG/1
CAPSULE ORAL
COMMUNITY
Start: 2020-03-24 | End: 2020-09-21

## 2020-03-25 RX ORDER — PHENAZOPYRIDINE HYDROCHLORIDE 200 MG/1
TABLET, FILM COATED ORAL
COMMUNITY
Start: 2020-03-24 | End: 2020-09-21

## 2020-03-25 NOTE — PROGRESS NOTES
Subjective:       Patient ID: Valerie Rodriguez is a 39 y.o. female.    Chief Complaint: Hernia    HPI 40 yo female with abdominal pain with associated nausea and vomiting in her mid abdomen and right groin  Review of Systems   Constitutional: Negative.    HENT: Negative.    Eyes: Negative.    Respiratory: Negative.    Cardiovascular: Negative.    Gastrointestinal: Positive for abdominal pain.   Endocrine: Negative.    Musculoskeletal: Negative.    Skin: Negative.    Allergic/Immunologic: Negative.    Neurological: Negative.    Hematological: Negative.    Psychiatric/Behavioral: Negative.    All other systems reviewed and are negative.      Objective:      Physical Exam   Constitutional: She is oriented to person, place, and time. She appears well-developed and well-nourished.   HENT:   Head: Normocephalic and atraumatic.   Right Ear: External ear normal.   Left Ear: External ear normal.   Nose: Nose normal.   Mouth/Throat: Oropharynx is clear and moist.   Eyes: Pupils are equal, round, and reactive to light. Conjunctivae and EOM are normal.   Neck: Normal range of motion. Neck supple.   Cardiovascular: Normal rate, regular rhythm, normal heart sounds and intact distal pulses.   Pulmonary/Chest: Effort normal and breath sounds normal.   Abdominal: Soft. Bowel sounds are normal. A hernia is present. Hernia confirmed positive in the ventral area.       Musculoskeletal: Normal range of motion.   Neurological: She is alert and oriented to person, place, and time. She has normal reflexes.   Skin: Skin is warm and dry.   Psychiatric: She has a normal mood and affect. Her behavior is normal. Thought content normal.   Vitals reviewed.      Assessment:       1. Right lower quadrant abdominal pain    2. Ventral hernia without obstruction or gangrene        Plan:       I will get a CT scan of her abdomen and I will see her back

## 2020-03-25 NOTE — LETTER
March 25, 2020      Lazaro Sabillon, NP  [address]  [facility] [address]           [facility], [facility]  [address], [address]  [facility] [address]  Phone: [phone]  Fax: [phone]          Patient: Valerie Rodriguez   MR Number: [id number]   YOB: 1980   Date of Visit: 3/25/2020       Dear Lazaro Sabillon:    Thank you for referring Valerie Rodriguez to me for evaluation. Attached you will find relevant portions of my assessment and plan of care.    If you have questions, please do not hesitate to call me. I look forward to following Valerie Rodriguez along with you.    Sincerely,    Kurt Randle MD    Enclosure  CC:  No Recipients    If you would like to receive this communication electronically, please contact [email or web address] or [phone] to request more information on [employer] Link access.    For providers and/or their staff who would like to refer a patient to [facility], please contact us through our one-stop-shop provider referral line, [facility], at [phone].    If you feel you have received this communication in error or would no longer like to receive these types of communications, please e-mail [email or web address]

## 2020-03-26 DIAGNOSIS — K43.9 VENTRAL HERNIA WITHOUT OBSTRUCTION OR GANGRENE: Primary | ICD-10-CM

## 2020-03-27 ENCOUNTER — HOSPITAL ENCOUNTER (OUTPATIENT)
Dept: RADIOLOGY | Facility: HOSPITAL | Age: 40
Discharge: HOME | End: 2020-03-27
Attending: SURGERY
Payer: MEDICAID

## 2020-03-27 DIAGNOSIS — K43.9 VENTRAL HERNIA WITHOUT OBSTRUCTION OR GANGRENE: ICD-10-CM

## 2020-03-27 PROCEDURE — 74177 CT ABDOMEN PELVIS WITH CONTRAST: ICD-10-PCS | Mod: 26,,, | Performed by: RADIOLOGY

## 2020-03-27 PROCEDURE — 25500020 PHARM REV CODE 255: Performed by: SURGERY

## 2020-03-27 PROCEDURE — 74177 CT ABD & PELVIS W/CONTRAST: CPT | Mod: 26,,, | Performed by: RADIOLOGY

## 2020-03-27 PROCEDURE — 74177 CT ABD & PELVIS W/CONTRAST: CPT | Mod: TC

## 2020-03-27 RX ADMIN — IOHEXOL 75 ML: 350 INJECTION, SOLUTION INTRAVENOUS at 08:03

## 2020-03-27 RX ADMIN — IOHEXOL 15 ML: 300 INJECTION, SOLUTION INTRAVENOUS at 07:03

## 2020-04-01 ENCOUNTER — OFFICE VISIT (OUTPATIENT)
Dept: SURGERY | Facility: CLINIC | Age: 40
End: 2020-04-01
Payer: MEDICAID

## 2020-04-01 VITALS
BODY MASS INDEX: 25.71 KG/M2 | HEIGHT: 66 IN | SYSTOLIC BLOOD PRESSURE: 114 MMHG | HEART RATE: 67 BPM | TEMPERATURE: 98 F | WEIGHT: 159.94 LBS | DIASTOLIC BLOOD PRESSURE: 78 MMHG

## 2020-04-01 DIAGNOSIS — K43.9 VENTRAL HERNIA WITHOUT OBSTRUCTION OR GANGRENE: Primary | ICD-10-CM

## 2020-04-01 PROCEDURE — 99214 PR OFFICE/OUTPT VISIT, EST, LEVL IV, 30-39 MIN: ICD-10-PCS | Mod: S$GLB,,, | Performed by: SURGERY

## 2020-04-01 PROCEDURE — 99214 OFFICE O/P EST MOD 30 MIN: CPT | Mod: S$GLB,,, | Performed by: SURGERY

## 2020-04-01 NOTE — PROGRESS NOTES
Subjective:       Patient ID: Valerie Rodriguez is a 39 y.o. female.    Chief Complaint: Results (Cat Scan)    HPI 38 yo female with abdominal pain and recent CT scan showing no evidence of hernia or recurrence  Review of Systems   Constitutional: Negative.    HENT: Negative.    Eyes: Negative.    Respiratory: Negative.    Cardiovascular: Negative.    Gastrointestinal: Negative.    Endocrine: Negative.    Musculoskeletal: Negative.    Skin: Negative.    Allergic/Immunologic: Negative.    Neurological: Negative.    Hematological: Negative.    Psychiatric/Behavioral: Negative.    All other systems reviewed and are negative.      Objective:      Physical Exam   Constitutional: She is oriented to person, place, and time. She appears well-developed and well-nourished.   HENT:   Head: Normocephalic and atraumatic.   Right Ear: External ear normal.   Left Ear: External ear normal.   Nose: Nose normal.   Mouth/Throat: Oropharynx is clear and moist.   Eyes: Pupils are equal, round, and reactive to light. Conjunctivae and EOM are normal.   Neck: Normal range of motion. Neck supple.   Cardiovascular: Normal rate, regular rhythm, normal heart sounds and intact distal pulses.   Pulmonary/Chest: Effort normal and breath sounds normal.   Abdominal: Soft. Bowel sounds are normal.   Musculoskeletal: Normal range of motion.   Neurological: She is alert and oriented to person, place, and time. She has normal reflexes.   Skin: Skin is warm and dry.   Psychiatric: She has a normal mood and affect. Her behavior is normal. Thought content normal.   Vitals reviewed.      Assessment:       1. Ventral hernia without obstruction or gangrene        Plan:       I have assured her that she will be fine and the local care with make symptoms better

## 2020-04-05 DIAGNOSIS — J30.9 ALLERGIC RHINITIS, UNSPECIFIED SEASONALITY, UNSPECIFIED TRIGGER: ICD-10-CM

## 2020-04-07 RX ORDER — FLUTICASONE PROPIONATE 50 MCG
SPRAY, SUSPENSION (ML) NASAL
Qty: 16 G | Refills: 1 | Status: SHIPPED | OUTPATIENT
Start: 2020-04-07 | End: 2020-09-21

## 2020-04-23 ENCOUNTER — OFFICE VISIT (OUTPATIENT)
Dept: FAMILY MEDICINE | Facility: CLINIC | Age: 40
End: 2020-04-23
Payer: MEDICAID

## 2020-04-23 DIAGNOSIS — M75.42 SHOULDER IMPINGEMENT SYNDROME, LEFT: Primary | ICD-10-CM

## 2020-04-23 PROCEDURE — 99214 OFFICE O/P EST MOD 30 MIN: CPT | Mod: 95,,, | Performed by: PHYSICIAN ASSISTANT

## 2020-04-23 PROCEDURE — 99214 PR OFFICE/OUTPT VISIT, EST, LEVL IV, 30-39 MIN: ICD-10-PCS | Mod: 95,,, | Performed by: PHYSICIAN ASSISTANT

## 2020-04-23 RX ORDER — METHYLPREDNISOLONE 4 MG/1
TABLET ORAL
Qty: 1 PACKAGE | Refills: 0 | Status: SHIPPED | OUTPATIENT
Start: 2020-04-23 | End: 2020-09-21

## 2020-04-23 NOTE — PROGRESS NOTES
Patient Name: Valerie Rodriguez    : 1980  MRN: [id number]    The patient location is: home  The chief complaint leading to consultation is: Left Shoulder Pain  Visit type: audiovisual  Total time spent with patient: 13 minutes  Each patient to whom he or she provides medical services by telemedicine is:  (1) informed of the relationship between the physician and patient and the respective role of any other health care provider with respect to management of the patient; and (2) notified that he or she may decline to receive medical services by telemedicine and may withdraw from such care at any time.      Subjective:  Valerie is a 39 y.o. female who presents today via video call:      HPI  Patient has multiple medical diagnoses as listed below in the history. Patient is new to me, but known to the clinic. She complains of left shoulder pain for 3 weeks. She reports onset of pain to occur after physically lifting her autistic son. Pain is worse when raising arm and with reaching motions. She reports associated neck pain with rotation and stiffness. Pain is exacerbated by laying on affected side. She has tried topical pain relievers and heating pad with some relief. He has not taking antiinflammatories because she has one kidney and is cautious with medications. She denies weakness, numbness/tingling, radiating pain or deformities.     Past Medical History  Past Medical History:   Diagnosis Date    Allergy     Glaucoma     Hyperlipidemia     Migraines     Mitral valve prolapse     Palpitations     Pre-op exam 2014    Swelling of face     after anesthesia    Transplant donor evaluation 2014       Past Surgical History  Past Surgical History:   Procedure Laterality Date    BREAST BIOPSY      BTL      BUNIONECTOMY      bilateral    ENDOMETRIAL ABLATION  2008    hemorroids      HERNIA REPAIR  2008    HYSTERECTOMY      INGUINAL HERNIA REPAIR      @ same time as endometrial  ablation    left foot      left foot x3 addition surgeries 2013; 2011, 2009(bunionectomy)    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    TUBAL LIGATION  2008    UMBILICAL HERNIA REPAIR  May 2015       Family History  Family History   Problem Relation Age of Onset    Hypertension Mother     Alcohol abuse Mother     Lupus Sister         ESRD s/p a kidney transplant    Hypertension Sister     Narcolepsy Brother     Breast cancer Neg Hx     Colon cancer Neg Hx     Ovarian cancer Neg Hx     Diabetes Neg Hx        Social History  Social History     Socioeconomic History    Marital status:      Spouse name: Not on file    Number of children: 5    Years of education: Not on file    Highest education level: Not on file   Occupational History    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      Employer: [employer]   Social Needs    Financial resource strain: Somewhat hard    Food insecurity:     Worry: Sometimes true     Inability: Sometimes true    Transportation needs:     Medical: No     Non-medical: No   Tobacco Use    Smoking status: Never Smoker    Smokeless tobacco: Never Used   Substance and Sexual Activity    Alcohol use: No     Frequency: Never     Drinks per session: Patient refused     Binge frequency: Never     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     Drug use: No    Sexual activity: Yes     Partners: Male     Birth control/protection: Surgical   Lifestyle    Physical activity:     Days per week: 7 days     Minutes per session: 150+ min    Stress: Very much   Relationships    Social connections:     Talks on phone: More than three times a week     Gets together: More than three times a week     Attends Confucianist service: Not on file     Active member of club or organization: Yes     Attends meetings of clubs or organizations: More than 4 times per year     Relationship status: Patient refused   Other Topics Concern    Not on file   Social History Narrative     Recently  for about a year    Wants to try again for another baby       Current Medications  Current Outpatient Medications on File Prior to Visit   Medication Sig Dispense Refill    albuterol (PROVENTIL) 2.5 mg /3 mL (0.083 %) nebulizer solution       albuterol (VENTOLIN HFA) 90 mcg/actuation inhaler Inhale 2 puffs into the lungs every 6 (six) hours as needed for Wheezing. Rescue      ALPRAZolam (XANAX) 0.5 MG tablet TK 1 T PO QD PRN  4    BOTOX 200 unit SolR       fluticasone propionate (FLONASE) 50 mcg/actuation nasal spray SHAKE LIQUID AND USE 1 SPRAY(50 MCG) IN EACH NOSTRIL EVERY DAY 16 g 1    nitrofurantoin, macrocrystal-monohydrate, (MACROBID) 100 MG capsule       ondansetron (ZOFRAN-ODT) 4 MG TbDL DIS ONE T PO Q 6 H PRN N  2    phenazopyridine (PYRIDIUM) 200 MG tablet        No current facility-administered medications on file prior to visit.        Allergies   Review of patient's allergies indicates:   Allergen Reactions    Adhesive      Clear tape.  Paper Tape= OK.    Other      ALLERGY Pain patches IOther reaction(s): Unknown    Penicillins Itching    Dilaudid [hydromorphone (bulk)] Nausea Only    Latex, natural rubber Rash    Sulfur Rash         ROS  Review of Systems   Constitutional: Negative for diaphoresis and fever.   Respiratory: Negative for shortness of breath.    Cardiovascular: Negative for chest pain.   Gastrointestinal: Negative for nausea.   Musculoskeletal: Positive for myalgias, neck pain and neck stiffness. Negative for back pain and joint swelling.        Positive Left shoulder pain   Skin: Negative for rash.   Neurological: Negative for weakness and numbness.         Objective:    LMP 12/25/2015     Physical Exam   Neck:   FROM with pain on rotation to the left and upward   Pulmonary/Chest: Effort normal.   Musculoskeletal:        Right shoulder: She exhibits normal range of motion.        Left shoulder: She exhibits decreased range of motion.         Arms:  Neurological: She is alert.   Psychiatric: She has a normal mood and affect.       Assessment/Plan:  Valerie Rodriguez is a 39 y.o. female who presents today for :    Diagnoses and all orders for this visit:    Shoulder impingement syndrome, left  -     methylPREDNISolone (MEDROL DOSEPACK) 4 mg tablet; use as directed  Counseled on clinical course of condition including symptomatology, treatment, and prevention.  Steroid therapy for antiinflammatory effect, will avoid NSAIDs due to one kidney   Alternate heat/cold therapy as needed  Follow up in 3-4 weeks if no improvement   Discussed various stretches and exercises to aid in recovery   Counseled regarding risks, benefits, and limitations of medications  Sent patient with informational material about diagnosis  Patient gave verbal understanding and agreement of plan       Encouraged to call/return to clinic if symptoms persist or worsen    Maddi Gerard PA-C  [facility]/ [facility]

## 2020-04-23 NOTE — PATIENT INSTRUCTIONS
Shoulder Impingement Syndrome  The rotator cuff is a group of muscles and tendons that surround the shoulder joint. These muscles and tendons hold the arm in its joint. They help the shoulder move. The rotator cuff muscles and tendons can become irritated from repeated rubbing against the shoulder bone. This is called shoulder impingement syndrome or rotator cuff tendonitis.     If your case is mild, you may only need to rest the shoulder and then do certain exercises to strengthen the muscles. You can also take anti-inflammatory medicines. Steroid injections into the shoulder can ease inflammation. But you can have only a limited number of these. If the condition gets worse, your shoulder muscles may become thin and weak. This can lead to a rotator cuff tear.  Symptoms of shoulder impingement syndrome may include:  · Shoulder pain that gets worse when you raise your arm overhead  · Weakness of the shoulder muscles when you use your arm overhead  · Popping and clicking when you move your shoulder  · Shoulder pain that wakes you up at night, especially when you sleep on the affected shoulder  · Sudden pain in your shoulder when you lift or reach  Home care  Follow these tips to take care of yourself at home:  · Avoid activities that make your pain worse. These include raising your arms overhead, repeating the same motion over and over, or lifting heavy objects.  · Dont hold your arm in one position for a long time. Keep it moving.  · Put an ice pack on the sore area for 20 minutes every 1 to 2 hours for the first day. You can make an ice pack by putting ice cubes in a plastic bag. Wrap the bag in a towel before putting it on your shoulder. A frozen bag of peas or something similar can also be used as an ice pack. Use the ice packs 3 to 4 times a day for the next 2 days. Continue using the ice to relieve of pain and swelling as needed.  · You may take acetaminophen or ibuprofen to control pain, unless another  medicine was prescribed. If prednisone was prescribed, dont take anti-inflammatory medicines. If you have chronic liver or kidney disease or ever had a stomach ulcer or gastrointestinal bleeding, talk with your doctor before using these medicines.  · After your symptoms ease, you may get physical therapy or start a home exercise program. This can strengthen your shoulder muscles and help your range of motion. Talk with your doctor about what is best for your condition.  Follow-up care  Follow up with your healthcare provider, or as advised.  When to seek medical advice  Call your healthcare provider right away if any of these occur:  · Shoulder pain that gets worse and wakes you up at night  · Your shoulder or arm swells  · Numbness, tingling, or pain that travels down the arm to the hand  · Loss of shoulder strength  · Fever or chills  Date Last Reviewed: 8/1/2016  © 1194-4360 [employer]. [address] [place] [address]. All rights reserved. This information is not intended as a substitute for professional medical care. Always follow your healthcare professional's instructions.        Shoulder and Upper Back Stretch  To start, stand tall with your ears, shoulders, and hips in line. Your feet should be slightly apart, positioned just under your hips. Focus your eyes directly in front of you.  this position for a few seconds before starting your exercise. This helps increase your awareness of proper posture.          Reach overhead and slightly back with both arms. Keep your shoulders and neck aligned and your elbows behind your shoulders:  · With your palms facing the ceiling, turn your fingers inward.  · Take a deep breath. Breathe out, and lower your elbows toward your buttocks. Hold for 5 seconds, then return to starting position.  · Repeat 3 times.  Date Last Reviewed: 8/16/2015  © 8324-6464 [employer]. [address] [place] [address]. All rights  reserved. This information is not intended as a substitute for professional medical care. Always follow your healthcare professional's instructions.        Shoulder Clock Exercise    To start, stand tall with your ears, shoulders, and hips in line. Your feet should be slightly apart, positioned just under your hips. Focus your eyes directly in front of you.  this position for a few seconds before starting your exercise. This helps increase your awareness of proper posture.  · Imagine that your right shoulder is the center of a clock. With the outer point of your shoulder, roll it around to slowly trace the outer edge of the clock.  · Move clockwise first, then counterclockwise.  · Repeat 3 to 5 times. Switch shoulders.   Date Last Reviewed: 10/2/2015  © 4661-4608 [employer]. [address] [place] [address]. All rights reserved. This information is not intended as a substitute for professional medical care. Always follow your healthcare professional's instructions.        Shoulder Girdle Stretch    To start, sit in a chair with your feet flat on the floor. Your weight should be slightly forward so that youre balanced evenly on your buttocks. Relax your shoulders and keep your head level. Using a chair with arms may help you keep your balance:  · Place 1 hand on the outside elbow of the other arm.  · Pull the arm across your body. Hold for 30 to 60 seconds. Repeat once.  · Switch sides.  For your safety, check with your healthcare provider before starting an exercise program.   Date Last Reviewed: 8/16/2015  © 8870-3269 [employer]. [address] [place] [address]. All rights reserved. This information is not intended as a substitute for professional medical care. Always follow your healthcare professional's instructions.        Shoulder Exercises    To start, sit in a chair with your feet flat on the floor. Your weight should be slightly forward so that youre  balanced evenly on your buttocks. Relax your shoulders and keep your head level. Avoid arching your back or rounding your shoulders. Using a chair with arms may help you keep your balance.  · Raise your arms, elbows bent, to shoulder height.  · Slowly move your forearms together. Hold for 5 seconds.  · Return to starting position. Repeat 5 times.  Date Last Reviewed: 10/1/2015  © 7375-9955 [employer]. [address] [place] [address]. All rights reserved. This information is not intended as a substitute for professional medical care. Always follow your healthcare professional's instructions.        Shoulder Shrug Exercise    To start, sit in a chair with your feet flat on the floor. Shift your weight slightly forward to avoid rounding your back. Relax. Keep your ears, shoulders, and hips aligned:  · Raise both of your shoulders as high as you can, as if you were trying to touch them to your ears. Keep your head and neck still and relaxed.  · Hold for a count of 10. Release.  · Repeat 5 times.  For your safety, check with your healthcare provider before starting an exercise program.   Date Last Reviewed: 8/16/2015  © 0993-8288 [employer]. [address] [place] [address]. All rights reserved. This information is not intended as a substitute for professional medical care. Always follow your healthcare professional's instructions.        Shoulder Squeeze Exercise    To start, sit in a chair with your feet flat on the floor. Shift your weight slightly forward to avoid rounding your back. Relax. Keep your ears, shoulders, and hips aligned:  · Raise your arms to shoulder height, elbows bent and palms forward.  · Move your arms back, squeezing your shoulder blades together.  · Hold for 10 seconds. Return to starting position.   · Repeat 5 times.   For your safety, check with your healthcare provider before starting an exercise program.   Date Last Reviewed: 8/16/2015  ©  1266-7666 [employer]. [address] [place] [address], [place] [address]. All rights reserved. This information is not intended as a substitute for professional medical care. Always follow your healthcare professional's instructions.

## 2020-09-21 ENCOUNTER — OFFICE VISIT (OUTPATIENT)
Dept: FAMILY MEDICINE | Facility: CLINIC | Age: 40
End: 2020-09-21
Payer: MEDICAID

## 2020-09-21 VITALS
WEIGHT: 171.06 LBS | BODY MASS INDEX: 27.61 KG/M2 | DIASTOLIC BLOOD PRESSURE: 86 MMHG | SYSTOLIC BLOOD PRESSURE: 112 MMHG

## 2020-09-21 DIAGNOSIS — R11.0 NAUSEA IN ADULT: ICD-10-CM

## 2020-09-21 DIAGNOSIS — G44.011 INTRACTABLE EPISODIC CLUSTER HEADACHE: ICD-10-CM

## 2020-09-21 DIAGNOSIS — K21.9 GASTROESOPHAGEAL REFLUX DISEASE WITHOUT ESOPHAGITIS: ICD-10-CM

## 2020-09-21 DIAGNOSIS — R07.89 INTERMITTENT LEFT-SIDED CHEST PAIN: Primary | ICD-10-CM

## 2020-09-21 DIAGNOSIS — J45.30 MILD PERSISTENT ASTHMA WITHOUT COMPLICATION: ICD-10-CM

## 2020-09-21 DIAGNOSIS — B37.0 THRUSH: ICD-10-CM

## 2020-09-21 DIAGNOSIS — B37.31 VAGINAL CANDIDA: ICD-10-CM

## 2020-09-21 PROCEDURE — 93010 ELECTROCARDIOGRAM REPORT: CPT | Mod: S$PBB,,, | Performed by: INTERNAL MEDICINE

## 2020-09-21 PROCEDURE — 99215 OFFICE O/P EST HI 40 MIN: CPT | Mod: S$PBB,,, | Performed by: PHYSICIAN ASSISTANT

## 2020-09-21 PROCEDURE — 93005 ELECTROCARDIOGRAM TRACING: CPT | Mod: PBBFAC,PO | Performed by: INTERNAL MEDICINE

## 2020-09-21 PROCEDURE — 93010 EKG 12-LEAD: ICD-10-PCS | Mod: S$PBB,,, | Performed by: INTERNAL MEDICINE

## 2020-09-21 PROCEDURE — 90471 IMMUNIZATION ADMIN: CPT | Mod: PBBFAC,PO

## 2020-09-21 PROCEDURE — 99999 PR PBB SHADOW E&M-EST. PATIENT-LVL III: CPT | Mod: PBBFAC,,, | Performed by: PHYSICIAN ASSISTANT

## 2020-09-21 PROCEDURE — 99215 PR OFFICE/OUTPT VISIT, EST, LEVL V, 40-54 MIN: ICD-10-PCS | Mod: S$PBB,,, | Performed by: PHYSICIAN ASSISTANT

## 2020-09-21 PROCEDURE — 99213 OFFICE O/P EST LOW 20 MIN: CPT | Mod: PBBFAC,25,PO | Performed by: PHYSICIAN ASSISTANT

## 2020-09-21 PROCEDURE — 99999 PR PBB SHADOW E&M-EST. PATIENT-LVL III: ICD-10-PCS | Mod: PBBFAC,,, | Performed by: PHYSICIAN ASSISTANT

## 2020-09-21 RX ORDER — FLUCONAZOLE 150 MG/1
150 TABLET ORAL DAILY
Qty: 2 TABLET | Refills: 0 | Status: SHIPPED | OUTPATIENT
Start: 2020-09-21 | End: 2020-09-23

## 2020-09-21 RX ORDER — ONDANSETRON 4 MG/1
TABLET, ORALLY DISINTEGRATING ORAL
Qty: 20 TABLET | Refills: 1 | Status: SHIPPED | OUTPATIENT
Start: 2020-09-21

## 2020-09-21 RX ORDER — ALBUTEROL SULFATE 90 UG/1
2 AEROSOL, METERED RESPIRATORY (INHALATION) EVERY 6 HOURS PRN
Qty: 18 G | Refills: 3 | Status: SHIPPED | OUTPATIENT
Start: 2020-09-21 | End: 2021-02-05

## 2020-09-21 RX ORDER — PANTOPRAZOLE SODIUM 20 MG/1
20 TABLET, DELAYED RELEASE ORAL DAILY
Qty: 30 TABLET | Refills: 5 | Status: SHIPPED | OUTPATIENT
Start: 2020-09-21 | End: 2021-03-07

## 2020-09-21 RX ORDER — SUMATRIPTAN SUCCINATE 25 MG/1
50 TABLET ORAL
Qty: 30 TABLET | Refills: 0 | Status: SHIPPED | OUTPATIENT
Start: 2020-09-21 | End: 2020-10-21

## 2020-09-21 RX ORDER — ALBUTEROL SULFATE 0.83 MG/ML
2.5 SOLUTION RESPIRATORY (INHALATION) EVERY 6 HOURS PRN
Qty: 1 BOX | Refills: 3 | Status: SHIPPED | OUTPATIENT
Start: 2020-09-21 | End: 2021-09-28

## 2020-09-21 NOTE — PROGRESS NOTES
Subjective:      Patient ID: Valerie Rodriguez is a 39 y.o. female.    Chief Complaint: Nausea (throwing up after eating), Migraine (been worse lately), and Gastroesophageal Reflux  Patient is new to me.    HPI   Patient reports worsening reflux for the past two weeks.  She reports that her throat feels like it is on fire.  Taken Nexium and Pepto Bismol without resolution of symptoms.  Patient has nightly left sided chest pain with left arm pain.  It was last evaluated in 2014.    Dr. Margret Hopkins, neurologist-migraines.  Treats her with Xanax and Zofran to manage these.  She has chronic migraines.    She has ongoing asthma issues.    Review of Systems   Constitutional: Positive for chills. Negative for fever.   Respiratory: Negative for shortness of breath.    Cardiovascular: Positive for chest pain (left sided every night with left arm pain).   Gastrointestinal: Positive for abdominal pain (epigastric), constipation, diarrhea, nausea and vomiting.   Neurological: Positive for dizziness, light-headedness and headaches.       Objective:   /86   Wt 77.6 kg (171 lb 1.2 oz)   LMP 12/25/2015   BMI 27.61 kg/m²      Physical Exam  Vitals signs reviewed.   Constitutional:       General: She is not in acute distress.     Appearance: Normal appearance. She is well-developed and well-groomed.   HENT:      Head: Normocephalic and atraumatic.      Right Ear: Hearing, tympanic membrane, ear canal and external ear normal.      Left Ear: Hearing, tympanic membrane, ear canal and external ear normal.      Nose:      Right Sinus: Maxillary sinus tenderness present. No frontal sinus tenderness.      Left Sinus: Maxillary sinus tenderness present. No frontal sinus tenderness.      Mouth/Throat:      Lips: Pink.      Mouth: Mucous membranes are moist.      Pharynx: Oropharynx is clear.   Eyes:      General: Lids are normal.      Conjunctiva/sclera: Conjunctivae normal.   Neck:      Musculoskeletal: Normal range of  motion.      Trachea: Phonation normal.   Cardiovascular:      Rate and Rhythm: Normal rate and regular rhythm.      Heart sounds: Normal heart sounds. No murmur. No friction rub. No gallop.    Pulmonary:      Effort: Pulmonary effort is normal. No respiratory distress.      Breath sounds: Normal breath sounds. No decreased breath sounds, wheezing, rhonchi or rales.   Abdominal:      General: Bowel sounds are normal.      Palpations: Abdomen is soft.      Tenderness: There is abdominal tenderness in the suprapubic area. There is no right CVA tenderness or left CVA tenderness.   Musculoskeletal: Normal range of motion.   Lymphadenopathy:      Head:      Right side of head: No submental, submandibular, tonsillar, preauricular, posterior auricular or occipital adenopathy.      Left side of head: No submental, submandibular, tonsillar, preauricular, posterior auricular or occipital adenopathy.      Cervical: No cervical adenopathy.   Skin:     General: Skin is warm and dry.      Findings: No rash.   Neurological:      General: No focal deficit present.      Mental Status: She is alert and oriented to person, place, and time.   Psychiatric:         Attention and Perception: Attention normal.         Mood and Affect: Mood normal.         Speech: Speech normal.         Behavior: Behavior normal. Behavior is cooperative.         Cognition and Memory: Cognition normal.         Judgment: Judgment normal.        Assessment:      1. Intermittent left-sided chest pain    2. Gastroesophageal reflux disease without esophagitis    3. Mild persistent asthma without complication    4. Nausea in adult    5. Thrush    6. Vaginal candida    7. Intractable episodic cluster headache       Plan:   1. Intermittent left-sided chest pain  Gave ER precautions for intense shortness of breath or chest pain.  - IN OFFICE EKG 12-LEAD (to Muse)    2. Gastroesophageal reflux disease without esophagitis  - pantoprazole (PROTONIX) 20 MG tablet; Take 1  tablet (20 mg total) by mouth once daily.  Dispense: 30 tablet; Refill: 5    3. Mild persistent asthma without complication  - albuterol (VENTOLIN HFA) 90 mcg/actuation inhaler; Inhale 2 puffs into the lungs every 6 (six) hours as needed for Wheezing. Rescue  Dispense: 18 g; Refill: 3  - albuterol (PROVENTIL) 2.5 mg /3 mL (0.083 %) nebulizer solution; Take 3 mLs (2.5 mg total) by nebulization every 6 (six) hours as needed for Wheezing.  Dispense: 1 Box; Refill: 3    4. Nausea in adult  - ondansetron (ZOFRAN-ODT) 4 MG TbDL; DIS ONE T PO Q 6 H PRN N  Dispense: 20 tablet; Refill: 1    5. Thrush  - fluconazole (DIFLUCAN) 150 MG Tab; Take 1 tablet (150 mg total) by mouth once daily. for 2 days  Dispense: 2 tablet; Refill: 0    6. Vaginal candida  - fluconazole (DIFLUCAN) 150 MG Tab; Take 1 tablet (150 mg total) by mouth once daily. for 2 days  Dispense: 2 tablet; Refill: 0    7. Intractable episodic cluster headache  Encouraged to call for follow up with Dr. Margret Hopkins.  - sumatriptan (IMITREX) 25 MG Tab; Take 2 tablets (50 mg total) by mouth every 2 (two) hours as needed (can use up to twice a day).  Dispense: 30 tablet; Refill: 0    Follow up in two weeks with me.  Patient agreed with plan and expressed understanding.    Thank you for allowing me to serve you,

## 2021-03-07 DIAGNOSIS — K21.9 GASTROESOPHAGEAL REFLUX DISEASE WITHOUT ESOPHAGITIS: ICD-10-CM

## 2021-03-07 RX ORDER — PANTOPRAZOLE SODIUM 20 MG/1
TABLET, DELAYED RELEASE ORAL
Qty: 30 TABLET | Refills: 0 | Status: SHIPPED | OUTPATIENT
Start: 2021-03-07

## 2021-05-06 ENCOUNTER — PATIENT MESSAGE (OUTPATIENT)
Dept: RESEARCH | Facility: HOSPITAL | Age: 41
End: 2021-05-06

## 2021-07-09 ENCOUNTER — OFFICE VISIT (OUTPATIENT)
Dept: URGENT CARE | Facility: CLINIC | Age: 41
End: 2021-07-09
Payer: MEDICAID

## 2021-07-09 VITALS
SYSTOLIC BLOOD PRESSURE: 114 MMHG | DIASTOLIC BLOOD PRESSURE: 76 MMHG | HEART RATE: 62 BPM | BODY MASS INDEX: 27.61 KG/M2 | TEMPERATURE: 97 F | HEIGHT: 66 IN
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
## 2021-07-09 DIAGNOSIS — M25.512 CHRONIC LEFT SHOULDER PAIN: Primary | ICD-10-CM

## 2021-07-09 DIAGNOSIS — S39.012A ACUTE MYOFASCIAL STRAIN OF LUMBAR REGION, INITIAL ENCOUNTER: ICD-10-CM

## 2021-07-09 DIAGNOSIS — G89.29 CHRONIC LEFT SHOULDER PAIN: Primary | ICD-10-CM

## 2021-07-09 PROCEDURE — 90714 TD VACC NO PRESV 7 YRS+ IM: CPT | Mod: S$GLB,,, | Performed by: PHYSICIAN ASSISTANT

## 2021-07-09 PROCEDURE — 99214 OFFICE O/P EST MOD 30 MIN: CPT | Mod: 25,S$GLB,, | Performed by: PHYSICIAN ASSISTANT

## 2021-07-09 PROCEDURE — 90714 TD VACCINE GREATER THAN OR EQUAL TO 7YO WITH PRESERVATIVE IM: ICD-10-PCS | Mod: S$GLB,,, | Performed by: PHYSICIAN ASSISTANT

## 2021-07-09 PROCEDURE — 90471 TD VACCINE GREATER THAN OR EQUAL TO 7YO WITH PRESERVATIVE IM: ICD-10-PCS | Mod: S$GLB,,, | Performed by: PHYSICIAN ASSISTANT

## 2021-07-09 PROCEDURE — 99214 PR OFFICE/OUTPT VISIT, EST, LEVL IV, 30-39 MIN: ICD-10-PCS | Mod: 25,S$GLB,, | Performed by: PHYSICIAN ASSISTANT

## 2021-07-09 PROCEDURE — 90471 IMMUNIZATION ADMIN: CPT | Mod: S$GLB,,, | Performed by: PHYSICIAN ASSISTANT

## 2021-07-09 RX ORDER — CYCLOBENZAPRINE HCL 10 MG
10 TABLET ORAL 3 TIMES DAILY PRN
Qty: 20 TABLET | Refills: 0 | Status: SHIPPED | OUTPATIENT
Start: 2021-07-09 | End: 2021-07-16

## 2021-07-09 RX ORDER — METHYLPREDNISOLONE 4 MG/1
TABLET ORAL
Qty: 1 PACKAGE | Refills: 0 | Status: SHIPPED | OUTPATIENT
Start: 2021-07-09

## 2021-07-09 RX ORDER — RIMEGEPANT SULFATE 75 MG/75MG
75 TABLET, ORALLY DISINTEGRATING ORAL DAILY PRN
COMMUNITY
Start: 2021-06-09

## 2021-07-16 ENCOUNTER — PATIENT MESSAGE (OUTPATIENT)
Dept: INTERNAL MEDICINE | Facility: CLINIC | Age: 41
End: 2021-07-16

## 2021-10-12 ENCOUNTER — TELEPHONE (OUTPATIENT)
Dept: UROLOGY | Facility: CLINIC | Age: 41
End: 2021-10-12

## 2021-10-15 ENCOUNTER — TELEPHONE (OUTPATIENT)
Dept: TRANSPLANT | Facility: CLINIC | Age: 41
End: 2021-10-15

## 2023-06-08 ENCOUNTER — HOSPITAL ENCOUNTER (OUTPATIENT)
Dept: RADIOLOGY | Facility: HOSPITAL | Age: 43
Discharge: HOME | End: 2023-06-08
Attending: FAMILY MEDICINE
Payer: MEDICAID

## 2023-06-08 DIAGNOSIS — R79.89 OTHER SPECIFIED ABNORMAL FINDINGS OF BLOOD CHEMISTRY: ICD-10-CM

## 2023-06-08 PROCEDURE — 76770 US RETROPERITONEAL COMPLETE: ICD-10-PCS | Mod: 26,,, | Performed by: RADIOLOGY

## 2023-06-08 PROCEDURE — 76770 US EXAM ABDO BACK WALL COMP: CPT | Mod: TC,PO

## 2023-06-08 PROCEDURE — 76770 US EXAM ABDO BACK WALL COMP: CPT | Mod: 26,,, | Performed by: RADIOLOGY

## 2023-10-20 ENCOUNTER — OFFICE VISIT (OUTPATIENT)
Dept: OBSTETRICS AND GYNECOLOGY | Facility: CLINIC | Age: 43
End: 2023-10-20
Payer: MEDICAID

## 2023-10-20 VITALS
SYSTOLIC BLOOD PRESSURE: 112 MMHG | HEIGHT: 66 IN | BODY MASS INDEX: 27.28 KG/M2 | WEIGHT: 169.75 LBS | DIASTOLIC BLOOD PRESSURE: 74 MMHG

## 2023-10-20 DIAGNOSIS — R10.2 VAGINAL PAIN: ICD-10-CM

## 2023-10-20 DIAGNOSIS — Z12.4 SCREENING FOR MALIGNANT NEOPLASM OF CERVIX: ICD-10-CM

## 2023-10-20 DIAGNOSIS — N95.1 MENOPAUSAL SYMPTOMS: ICD-10-CM

## 2023-10-20 DIAGNOSIS — Z01.419 WELL WOMAN EXAM WITH ROUTINE GYNECOLOGICAL EXAM: Primary | ICD-10-CM

## 2023-10-20 DIAGNOSIS — Z12.31 ENCOUNTER FOR SCREENING MAMMOGRAM FOR MALIGNANT NEOPLASM OF BREAST: ICD-10-CM

## 2023-10-20 LAB
BILIRUB UR QL STRIP: NEGATIVE
CLARITY UR REFRACT.AUTO: CLEAR
COLOR UR AUTO: YELLOW
GLUCOSE UR QL STRIP: NEGATIVE
HGB UR QL STRIP: NEGATIVE
KETONES UR QL STRIP: NEGATIVE
LEUKOCYTE ESTERASE UR QL STRIP: NEGATIVE
NITRITE UR QL STRIP: NEGATIVE
PH UR STRIP: 5 [PH] (ref 5–8)
PROT UR QL STRIP: NEGATIVE
SP GR UR STRIP: 1.01 (ref 1–1.03)
URN SPEC COLLECT METH UR: NORMAL

## 2023-10-20 PROCEDURE — 1159F PR MEDICATION LIST DOCUMENTED IN MEDICAL RECORD: ICD-10-PCS | Mod: CPTII,,, | Performed by: OBSTETRICS & GYNECOLOGY

## 2023-10-20 PROCEDURE — 3078F DIAST BP <80 MM HG: CPT | Mod: CPTII,,, | Performed by: OBSTETRICS & GYNECOLOGY

## 2023-10-20 PROCEDURE — 81003 URINALYSIS AUTO W/O SCOPE: CPT

## 2023-10-20 PROCEDURE — 87077 CULTURE AEROBIC IDENTIFY: CPT

## 2023-10-20 PROCEDURE — 3078F PR MOST RECENT DIASTOLIC BLOOD PRESSURE < 80 MM HG: ICD-10-PCS | Mod: CPTII,,, | Performed by: OBSTETRICS & GYNECOLOGY

## 2023-10-20 PROCEDURE — 1159F MED LIST DOCD IN RCRD: CPT | Mod: CPTII,,, | Performed by: OBSTETRICS & GYNECOLOGY

## 2023-10-20 PROCEDURE — 87086 URINE CULTURE/COLONY COUNT: CPT

## 2023-10-20 PROCEDURE — 87491 CHLMYD TRACH DNA AMP PROBE: CPT

## 2023-10-20 PROCEDURE — 99999 PR PBB SHADOW E&M-EST. PATIENT-LVL III: CPT | Mod: PBBFAC,,, | Performed by: OBSTETRICS & GYNECOLOGY

## 2023-10-20 PROCEDURE — 87088 URINE BACTERIA CULTURE: CPT

## 2023-10-20 PROCEDURE — 81514 NFCT DS BV&VAGINITIS DNA ALG: CPT

## 2023-10-20 PROCEDURE — 3008F PR BODY MASS INDEX (BMI) DOCUMENTED: ICD-10-PCS | Mod: CPTII,,, | Performed by: OBSTETRICS & GYNECOLOGY

## 2023-10-20 PROCEDURE — 99386 PR PREVENTIVE VISIT,NEW,40-64: ICD-10-PCS | Mod: S$PBB,,, | Performed by: OBSTETRICS & GYNECOLOGY

## 2023-10-20 PROCEDURE — 3074F PR MOST RECENT SYSTOLIC BLOOD PRESSURE < 130 MM HG: ICD-10-PCS | Mod: CPTII,,, | Performed by: OBSTETRICS & GYNECOLOGY

## 2023-10-20 PROCEDURE — 99213 OFFICE O/P EST LOW 20 MIN: CPT | Mod: PBBFAC,PO | Performed by: OBSTETRICS & GYNECOLOGY

## 2023-10-20 PROCEDURE — 1160F RVW MEDS BY RX/DR IN RCRD: CPT | Mod: CPTII,,, | Performed by: OBSTETRICS & GYNECOLOGY

## 2023-10-20 PROCEDURE — 87186 SC STD MICRODIL/AGAR DIL: CPT

## 2023-10-20 PROCEDURE — 99999 PR PBB SHADOW E&M-EST. PATIENT-LVL III: ICD-10-PCS | Mod: PBBFAC,,, | Performed by: OBSTETRICS & GYNECOLOGY

## 2023-10-20 PROCEDURE — 99386 PREV VISIT NEW AGE 40-64: CPT | Mod: S$PBB,,, | Performed by: OBSTETRICS & GYNECOLOGY

## 2023-10-20 PROCEDURE — 3074F SYST BP LT 130 MM HG: CPT | Mod: CPTII,,, | Performed by: OBSTETRICS & GYNECOLOGY

## 2023-10-20 PROCEDURE — 3008F BODY MASS INDEX DOCD: CPT | Mod: CPTII,,, | Performed by: OBSTETRICS & GYNECOLOGY

## 2023-10-20 PROCEDURE — 1160F PR REVIEW ALL MEDS BY PRESCRIBER/CLIN PHARMACIST DOCUMENTED: ICD-10-PCS | Mod: CPTII,,, | Performed by: OBSTETRICS & GYNECOLOGY

## 2023-10-20 RX ORDER — ESTRADIOL 0.5 MG/1
0.5 TABLET ORAL DAILY
Qty: 30 TABLET | Refills: 11 | Status: SHIPPED | OUTPATIENT
Start: 2023-10-20 | End: 2024-10-19

## 2023-10-20 NOTE — PROGRESS NOTES
CC: Annual check-up    SUBJECTIVE:   42 y.o. female   for annual routine Pap and checkup. Patient's last menstrual period was 2015..  She reports frequent vaginal discharge and irritation but none currently. 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 Also reports night sweats, hot flashes, and vaginal dryness. Currently sexually active with 1 male partner. Denies current discharge, rash, lesions. Hx of hysterectomy-salpingectomy / to endometriosis.       Past Medical History:   Diagnosis Date    Allergy     Glaucoma     Hyperlipidemia     Migraines     Mitral valve prolapse     Palpitations     Pre-op exam 2014    Swelling of face     after anesthesia    Transplant donor evaluation 2014     Past Surgical History:   Procedure Laterality Date    BREAST BIOPSY      BTL      BUNIONECTOMY      bilateral    ENDOMETRIAL ABLATION      hemorroids      HERNIA REPAIR      HYSTERECTOMY      INGUINAL HERNIA REPAIR      @ same time as endometrial ablation    left foot      left foot x3 addition surgeries ; , (bunionectomy)    Left kidney donation  2014    TUBAL LIGATION  2008    UMBILICAL HERNIA REPAIR  May 2015     Social History     Socioeconomic History    Marital status:     Number of children: 5   Occupational History    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      Employer: [employer]   Tobacco Use    Smoking status: Never    Smokeless tobacco: Never   Substance and Sexual Activity    Alcohol use: No     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     Drug use: No    Sexual activity: Not Currently     Partners: Male     Birth control/protection: Surgical   Social History Narrative    Recently  for about a year    Wants to try again for another baby     Social Determinants of Health     Financial Resource Strain: Medium Risk (2019)    Overall Financial Resource Strain (CARDIA)      Difficulty of Paying Living Expenses: Somewhat hard   Food Insecurity: Food Insecurity Present (2019)    Hunger Vital Sign     Worried About Running Out of Food in the Last Year: Sometimes true     Ran Out of Food in the Last Year: Sometimes true   Transportation Needs: No Transportation Needs (2019)    PRAPARE - Transportation     Lack of Transportation (Medical): No     Lack of Transportation (Non-Medical): No   Physical Activity: Sufficiently Active (2019)    Exercise Vital Sign     Days of Exercise per Week: 7 days     Minutes of Exercise per Session: 150+ min   Stress: Stress Concern Present (2019)    [employer] - Occupational Stress Questionnaire     Feeling of Stress : Very much   Social Connections: Unknown (2019)    Social Connection and Isolation Panel [NHANES]     Frequency of Communication with Friends and Family: More than three times a week     Frequency of Social Gatherings with Friends and Family: More than three times a week     Active Member of Clubs or Organizations: Yes     Attends Club or Organization Meetings: More than 4 times per year     Marital Status: Patient refused     Family History   Problem Relation Age of Onset    Hypertension Mother     Alcohol abuse Mother     Lupus Sister         ESRD s/p a kidney transplant    Hypertension Sister     Narcolepsy Brother     Breast cancer Neg Hx     Colon cancer Neg Hx     Ovarian cancer Neg Hx     Diabetes Neg Hx      OB History    Para Term  AB Living   5 5 4 1 0 5   SAB IAB Ectopic Multiple Live Births   0 0 0 0 5      # Outcome Date GA Lbr Cristian/2nd Weight Sex Delivery Anes PTL Lv   5 Term      Vag-Spont   JEANIE   4 Term      Vag-Spont   JEANIE   3 Term      Vag-Spont   JEANIE   2       Vag-Spont   JEANIE   1 Term      Vag-Spont   JEANIE         Current Outpatient Medications   Medication Sig Dispense Refill    BOTOX 200 unit SolR       methylPREDNISolone (MEDROL DOSEPACK) 4 mg tablet use  as directed 1 Package 0    ondansetron (ZOFRAN-ODT) 4 MG TbDL DIS ONE T PO Q 6 H PRN N 20 tablet 1    albuterol (PROVENTIL) 2.5 mg /3 mL (0.083 %) nebulizer solution USE 1 VIAL VIA NEBULIZER EVERY 6 HOURS AS NEEDED FOR WHEEZING (Patient not taking: Reported on 10/20/2023) 75 mL 0    albuterol (PROVENTIL/VENTOLIN HFA) 90 mcg/actuation inhaler INHALE 2 PUFFS INTO THE LUNGS EVERY 6 HOURS AS NEEDED FOR WHEEZING( RESCUE) (Patient not taking: Reported on 7/9/2021) 18 g 3    ALPRAZolam (XANAX) 0.5 MG tablet TK 1 T PO QD PRN  4    estradioL (ESTRACE) 0.5 MG tablet Take 1 tablet (0.5 mg total) by mouth once daily. 30 tablet 11    NURTEC 75 mg odt Take 75 mg by mouth daily as needed.      pantoprazole (PROTONIX) 20 MG tablet TAKE 1 TABLET(20 MG) BY MOUTH EVERY DAY (Patient not taking: No sig reported) 30 tablet 0    sumatriptan (IMITREX) 25 MG Tab Take 2 tablets (50 mg total) by mouth every 2 (two) hours as needed (can use up to twice a day). 30 tablet 0     No current facility-administered medications for this visit.     Allergies: Adhesive; Other; Penicillins; Dilaudid [hydromorphone (bulk)]; Latex, natural rubber; and Sulfur     ROS:  Constitutional: no weight loss, weight gain, fever, fatigue  Eyes:  No vision changes, glasses/contacts  ENT/Mouth: No ulcers, sinus problems, ears ringing, headache  Cardiovascular: No inability to lie flat, chest pain, exercise intolerance, swelling, heart palpitations  Respiratory: No wheezing, coughing blood, shortness of breath, or cough  Gastrointestinal: No diarrhea, bloody stool, nausea/vomiting, constipation, gas, hemorrhoids  Genitourinary: No blood in urine, painful urination, urgency of urination, frequency of urination, incomplete emptying, incontinence, abnormal bleeding, painful periods, heavy periods, vaginal discharge, vaginal odor, painful intercourse, sexual problems, bleeding after intercourse.  Musculoskeletal: No muscle weakness  Skin/Breast: No painful breasts, nipple  "discharge, masses, rash, ulcers  Neurological: No passing out, seizures, numbness, headache  Endocrine: No diabetes, hypothyroid, hyperthyroid, hot flashes, hair loss, abnormal hair growth, ance  Psychiatric: No depression, crying  Hematologic: No bruises, bleeding, swollen lymph nodes, anemia.      OBJECTIVE:   The patient appears well, alert, oriented x 3, in no distress.  /74   Ht 5' 6" (1.676 m)   Wt 77 kg (169 lb 12.1 oz)   LMP 12/25/2015   BMI 27.40 kg/m²   NECK: negative  SKIN: no acne, striae, hirsutism  BREAST EXAM: not examined  ABDOMEN: no hernias, masses, or hepatosplenomegaly  GENITALIA: normal external genitalia, no erythema, no discharge  URETHRA: normal urethra, normal urethral meatus  VAGINA: mucosal atrophy, pain with insertion of speculum, no discharge  CERVIX: absent  UTERUS: uterus absent  ADNEXA: no mass, fullness, tenderness      ASSESSMENT:   well woman  1. Well woman exam with routine gynecological exam    2. Screening for malignant neoplasm of cervix    3. Encounter for screening mammogram for malignant neoplasm of breast    4. Vaginal pain    5. Menopausal symptoms        PLAN:   mammogram  pap smear  additional lab tests per orders  Counseled on use of HRT  return annually or prn  Orders Placed This Encounter    VAGINOSIS SCREEN BY DNA PROBE    C. trachomatis/N. gonorrhoeae by [facility]; Vagina    CULTURE, URINE    Mammo Digital Screening Bilat w/ [facility]    Follicle Stimulating Hormone    Urinalysis    estradioL (ESTRACE) 0.5 MG tablet     ________________________  Attila Pennington MD  [facility] PGY-2    "

## 2023-10-21 LAB
BACTERIAL VAGINOSIS DNA: POSITIVE
C TRACH DNA SPEC QL NAA+PROBE: NOT DETECTED
CANDIDA GLABRATA DNA: NEGATIVE
CANDIDA KRUSEI DNA: NEGATIVE
CANDIDA RRNA VAG QL PROBE: NEGATIVE
N GONORRHOEA DNA SPEC QL NAA+PROBE: NOT DETECTED
T VAGINALIS RRNA GENITAL QL PROBE: NEGATIVE

## 2023-10-22 LAB — BACTERIA UR CULT: ABNORMAL

## 2023-12-13 ENCOUNTER — HOSPITAL ENCOUNTER (OUTPATIENT)
Dept: RADIOLOGY | Facility: HOSPITAL | Age: 43
Discharge: HOME | End: 2023-12-13
Payer: MEDICAID

## 2023-12-13 DIAGNOSIS — Z12.31 ENCOUNTER FOR SCREENING MAMMOGRAM FOR MALIGNANT NEOPLASM OF BREAST: ICD-10-CM

## 2023-12-13 PROCEDURE — 77063 MAMMO DIGITAL SCREENING BILAT WITH TOMO: ICD-10-PCS | Mod: 26,,, | Performed by: RADIOLOGY

## 2023-12-13 PROCEDURE — 77063 BREAST TOMOSYNTHESIS BI: CPT | Mod: 26,,, | Performed by: RADIOLOGY

## 2023-12-13 PROCEDURE — 77067 SCR MAMMO BI INCL CAD: CPT | Mod: TC,PO

## 2023-12-13 PROCEDURE — 77067 SCR MAMMO BI INCL CAD: CPT | Mod: 26,,, | Performed by: RADIOLOGY

## 2023-12-13 PROCEDURE — 77067 MAMMO DIGITAL SCREENING BILAT WITH TOMO: ICD-10-PCS | Mod: 26,,, | Performed by: RADIOLOGY

## 2025-01-03 ENCOUNTER — HOSPITAL ENCOUNTER (OUTPATIENT)
Dept: RADIOLOGY | Facility: HOSPITAL | Age: 45
Discharge: HOME | End: 2025-01-03
Attending: NURSE PRACTITIONER
Payer: MEDICAID

## 2025-01-03 DIAGNOSIS — Z12.31 SCREENING MAMMOGRAM, ENCOUNTER FOR: ICD-10-CM

## 2025-01-03 PROCEDURE — 77063 BREAST TOMOSYNTHESIS BI: CPT | Mod: 26,,, | Performed by: RADIOLOGY

## 2025-01-03 PROCEDURE — 77067 SCR MAMMO BI INCL CAD: CPT | Mod: 26,,, | Performed by: RADIOLOGY

## 2025-01-03 PROCEDURE — 77067 SCR MAMMO BI INCL CAD: CPT | Mod: TC,PO
